# Patient Record
Sex: MALE | Race: BLACK OR AFRICAN AMERICAN | NOT HISPANIC OR LATINO | Employment: OTHER | URBAN - METROPOLITAN AREA
[De-identification: names, ages, dates, MRNs, and addresses within clinical notes are randomized per-mention and may not be internally consistent; named-entity substitution may affect disease eponyms.]

---

## 2017-01-05 ENCOUNTER — TELEPHONE (OUTPATIENT)
Dept: FAMILY MEDICINE | Facility: CLINIC | Age: 57
End: 2017-01-05

## 2017-01-05 NOTE — TELEPHONE ENCOUNTER
----- Message from Thalia Yu sent at 1/5/2017  1:11 PM CST -----  Contact: [phone]  Pt is requesting a order for blood work for a complete panel  but pt said he received a letter from the blood bank stating he tested positive for Hepatitis B and he wants to follow up on that pt is requesting  to get it done today of possible Please call pt at your earliest convenience.  Thanks !

## 2017-01-11 ENCOUNTER — DOCUMENTATION ONLY (OUTPATIENT)
Dept: BARIATRICS | Facility: CLINIC | Age: 57
End: 2017-01-11

## 2017-01-12 ENCOUNTER — TELEPHONE (OUTPATIENT)
Dept: FAMILY MEDICINE | Facility: CLINIC | Age: 57
End: 2017-01-12

## 2017-01-12 NOTE — TELEPHONE ENCOUNTER
----- Message from Юлия Noble sent at 1/12/2017  9:56 AM CST -----  Contact: self  Pt calling to schedule labs to check hep b and diabetes. Please call [phone].

## 2017-03-14 ENCOUNTER — PATIENT MESSAGE (OUTPATIENT)
Dept: FAMILY MEDICINE | Facility: CLINIC | Age: 57
End: 2017-03-14

## 2017-03-14 DIAGNOSIS — I10 ESSENTIAL HYPERTENSION: Primary | ICD-10-CM

## 2017-03-14 DIAGNOSIS — D50.9 IRON DEFICIENCY ANEMIA, UNSPECIFIED IRON DEFICIENCY ANEMIA TYPE: ICD-10-CM

## 2017-03-14 DIAGNOSIS — E55.9 VITAMIN D DEFICIENCY DISEASE: ICD-10-CM

## 2017-03-14 DIAGNOSIS — E11.9 CONTROLLED TYPE 2 DIABETES MELLITUS WITHOUT COMPLICATION, WITHOUT LONG-TERM CURRENT USE OF INSULIN: Chronic | ICD-10-CM

## 2017-03-16 NOTE — TELEPHONE ENCOUNTER
Dr. Hernandez would you put in orders for my diabetic / annual labs on  March 27, 2017  To be done at the [facility]. I have am 8:00 a.m. appointment with Dr. Ayaan Castellon, @ that location, therefore I  would  not have to come back later to have it done. Thank you for your consideration  Washington Manzo

## 2017-03-27 ENCOUNTER — LAB VISIT (OUTPATIENT)
Dept: LAB | Facility: HOSPITAL | Age: 57
End: 2017-03-27
Attending: INTERNAL MEDICINE
Payer: COMMERCIAL

## 2017-03-27 ENCOUNTER — OFFICE VISIT (OUTPATIENT)
Dept: UROLOGY | Facility: CLINIC | Age: 57
End: 2017-03-27
Payer: COMMERCIAL

## 2017-03-27 VITALS
BODY MASS INDEX: 45.1 KG/M2 | HEART RATE: 73 BPM | HEIGHT: 70 IN | SYSTOLIC BLOOD PRESSURE: 136 MMHG | DIASTOLIC BLOOD PRESSURE: 83 MMHG | WEIGHT: 315 LBS

## 2017-03-27 DIAGNOSIS — Z80.42 FAMILY HISTORY OF PROSTATE CANCER: Primary | ICD-10-CM

## 2017-03-27 DIAGNOSIS — E11.9 CONTROLLED TYPE 2 DIABETES MELLITUS WITHOUT COMPLICATION, WITHOUT LONG-TERM CURRENT USE OF INSULIN: Chronic | ICD-10-CM

## 2017-03-27 DIAGNOSIS — E55.9 VITAMIN D DEFICIENCY DISEASE: ICD-10-CM

## 2017-03-27 DIAGNOSIS — D50.9 IRON DEFICIENCY ANEMIA, UNSPECIFIED IRON DEFICIENCY ANEMIA TYPE: ICD-10-CM

## 2017-03-27 DIAGNOSIS — Z80.42 FAMILY HISTORY OF PROSTATE CANCER IN FATHER: ICD-10-CM

## 2017-03-27 DIAGNOSIS — N13.8 BPH WITH URINARY OBSTRUCTION: ICD-10-CM

## 2017-03-27 DIAGNOSIS — I10 ESSENTIAL HYPERTENSION: ICD-10-CM

## 2017-03-27 DIAGNOSIS — N40.1 BPH WITH URINARY OBSTRUCTION: ICD-10-CM

## 2017-03-27 LAB
25(OH)D3+25(OH)D2 SERPL-MCNC: 17 NG/ML
ALBUMIN SERPL BCP-MCNC: 3 G/DL
ALP SERPL-CCNC: 96 U/L
ALT SERPL W/O P-5'-P-CCNC: 23 U/L
ANION GAP SERPL CALC-SCNC: 6 MMOL/L
AST SERPL-CCNC: 18 U/L
BASOPHILS # BLD AUTO: 0.02 K/UL
BASOPHILS NFR BLD: 0.3 %
BILIRUB SERPL-MCNC: 0.6 MG/DL
BUN SERPL-MCNC: 17 MG/DL
CALCIUM SERPL-MCNC: 8.7 MG/DL
CHLORIDE SERPL-SCNC: 104 MMOL/L
CHOLEST/HDLC SERPL: 3.2 {RATIO}
CO2 SERPL-SCNC: 29 MMOL/L
COMPLEXED PSA SERPL-MCNC: 0.44 NG/ML
COMPLEXED PSA SERPL-MCNC: 0.44 NG/ML
CREAT SERPL-MCNC: 0.8 MG/DL
DIFFERENTIAL METHOD: ABNORMAL
EOSINOPHIL # BLD AUTO: 0.1 K/UL
EOSINOPHIL NFR BLD: 1.6 %
ERYTHROCYTE [DISTWIDTH] IN BLOOD BY AUTOMATED COUNT: 15.1 %
EST. GFR  (AFRICAN AMERICAN): >60 ML/MIN/1.73 M^2
EST. GFR  (NON AFRICAN AMERICAN): >60 ML/MIN/1.73 M^2
ESTIMATED AVG GLUCOSE: 126 MG/DL
FERRITIN SERPL-MCNC: 27 NG/ML
GLUCOSE SERPL-MCNC: 107 MG/DL
HBA1C MFR BLD HPLC: 6 %
HCT VFR BLD AUTO: 39.1 %
HDL/CHOLESTEROL RATIO: 31.3 %
HDLC SERPL-MCNC: 201 MG/DL
HDLC SERPL-MCNC: 63 MG/DL
HGB BLD-MCNC: 12.5 G/DL
IRON SERPL-MCNC: 123 UG/DL
LDLC SERPL CALC-MCNC: 114.4 MG/DL
LYMPHOCYTES # BLD AUTO: 3.1 K/UL
LYMPHOCYTES NFR BLD: 48.3 %
MCH RBC QN AUTO: 25.8 PG
MCHC RBC AUTO-ENTMCNC: 32 %
MCV RBC AUTO: 81 FL
MONOCYTES # BLD AUTO: 0.5 K/UL
MONOCYTES NFR BLD: 7.2 %
NEUTROPHILS # BLD AUTO: 2.7 K/UL
NEUTROPHILS NFR BLD: 42.6 %
NONHDLC SERPL-MCNC: 138 MG/DL
PLATELET # BLD AUTO: 308 K/UL
PMV BLD AUTO: 10 FL
POTASSIUM SERPL-SCNC: 4.1 MMOL/L
PROT SERPL-MCNC: 6.8 G/DL
RBC # BLD AUTO: 4.85 M/UL
SATURATED IRON: 31 %
SODIUM SERPL-SCNC: 139 MMOL/L
TOTAL IRON BINDING CAPACITY: 401 UG/DL
TRANSFERRIN SERPL-MCNC: 271 MG/DL
TRIGL SERPL-MCNC: 118 MG/DL
TSH SERPL DL<=0.005 MIU/L-ACNC: 1.23 UIU/ML
WBC # BLD AUTO: 6.38 K/UL

## 2017-03-27 PROCEDURE — 99999 PR PBB SHADOW E&M-EST. PATIENT-LVL III: CPT | Mod: PBBFAC,,, | Performed by: UROLOGY

## 2017-03-27 PROCEDURE — 1160F RVW MEDS BY RX/DR IN RCRD: CPT | Mod: S$GLB,,, | Performed by: UROLOGY

## 2017-03-27 PROCEDURE — 84466 ASSAY OF TRANSFERRIN: CPT

## 2017-03-27 PROCEDURE — 84443 ASSAY THYROID STIM HORMONE: CPT

## 2017-03-27 PROCEDURE — 84153 ASSAY OF PSA TOTAL: CPT

## 2017-03-27 PROCEDURE — 36415 COLL VENOUS BLD VENIPUNCTURE: CPT

## 2017-03-27 PROCEDURE — 83540 ASSAY OF IRON: CPT

## 2017-03-27 PROCEDURE — 3079F DIAST BP 80-89 MM HG: CPT | Mod: S$GLB,,, | Performed by: UROLOGY

## 2017-03-27 PROCEDURE — 82306 VITAMIN D 25 HYDROXY: CPT

## 2017-03-27 PROCEDURE — 80053 COMPREHEN METABOLIC PANEL: CPT

## 2017-03-27 PROCEDURE — 3075F SYST BP GE 130 - 139MM HG: CPT | Mod: S$GLB,,, | Performed by: UROLOGY

## 2017-03-27 PROCEDURE — 84153 ASSAY OF PSA TOTAL: CPT | Mod: 91

## 2017-03-27 PROCEDURE — 83036 HEMOGLOBIN GLYCOSYLATED A1C: CPT

## 2017-03-27 PROCEDURE — 82728 ASSAY OF FERRITIN: CPT

## 2017-03-27 PROCEDURE — 85025 COMPLETE CBC W/AUTO DIFF WBC: CPT

## 2017-03-27 PROCEDURE — 80061 LIPID PANEL: CPT

## 2017-03-27 PROCEDURE — 99213 OFFICE O/P EST LOW 20 MIN: CPT | Mod: S$GLB,,, | Performed by: UROLOGY

## 2017-03-27 NOTE — MR AVS SNAPSHOT
[facility] - Urology 4th Floor  [address]  [facility] [address]  Phone: [phone]                  Washington Manzo   3/27/2017 8:15 AM   Office Visit    Description:  Male : 1960   Provider:  Ayaan Reyes Jr., MD   Department:  [facility] - Urology 4th Floor           Reason for Visit     Benign Prostatic Hypertrophy           Diagnoses this Visit        Comments    Family history of prostate cancer    -  Primary            To Do List           Goals (5 Years of Data)     None      [facility]     [facility] Nurse Care Line -  Assistance  Registered nurses in the [facility] provide clinical advisement, health education, appointment booking, and other advisory services.  Call for this free service at [phone].             Medications           Message regarding Medications     Verify the changes and/or additions to your medication regime listed below are the same as discussed with your clinician today.  If any of these changes or additions are incorrect, please notify your healthcare provider.             Verify that the below list of medications is an accurate representation of the medications you are currently taking.  If none reported, the list may be blank. If incorrect, please contact your healthcare provider. Carry this list with you in case of emergency.           Current Medications     b complex vitamins tablet Take 1 tablet by mouth once daily.    BREEZE 2 TEST STRIPS Strp     CALCIUM CITRATE/VITAMIN D3 (CALCIUM CITRATE + D ORAL) Take 1,500 mg by mouth.    cyanocobalamin (VITAMIN B-12) 1000 MCG tablet Take 1,000 mcg by mouth once a week.     LANCETS & BLOOD GLUCOSE STRIPS MISC 2 (two) times daily.     polyethylene glycol (GLYCOLAX) 17 gram/dose powder TAKE ONE CAPFUL mixed IN WATER BY MOUTH DAILY    tadalafil (CIALIS) 20 MG Tab Take 1 tablet (20 mg total) by mouth as directed. 1 tab as needed every 36 hours    valsartan-hydrochlorothiazide  "(DIOVAN-HCT) 160-12.5 mg per tablet TAKE ONE TABLET BY MOUTH EVERY DAY    vitamin D 1000 units Tab Take by mouth once daily.    hydrocodone-acetaminophen 7.5-325mg (NORCO) 7.5-325 mg per tablet Take 1 tablet by mouth every 6 (six) hours as needed for Pain.    multivitamin with minerals tablet Take 1 tablet by mouth once daily.           Clinical Reference Information           Your Vitals Were     BP Pulse Height Weight BMI    136/83 73 5' 10" (1.778 m) 159.7 kg (352 lb 1.2 oz) 50.52 kg/m2      Blood Pressure          Most Recent Value    BP  136/83      Allergies as of 3/27/2017     Mobic [Meloxicam]    Morphine      Immunizations Administered on Date of Encounter - 3/27/2017     None      Orders Placed During Today's Visit     Future Labs/Procedures Expected by Expires    Prostate Specific Antigen, Diagnostic  9/23/2017 3/27/2018      Language Assistance Services     ATTENTION: Language assistance services are available, free of charge. Please call [phone].      ATENCIÓN: Si habla dillon, tiene a armando disposición servicios gratuitos de asistencia lingüística. Llame al [phone].     LISS Ý: N?u b?n nói Ti?ng Vi?t, có các d?ch v? h? tr? ngôn ng? mi?n phí dành cho b?n. G?i s? [phone].         [facility]rology 4th Floor complies with applicable Federal civil rights laws and does not discriminate on the basis of race, color, national origin, age, disability, or sex.        "

## 2017-04-13 DIAGNOSIS — Z11.59 NEED FOR HEPATITIS C SCREENING TEST: ICD-10-CM

## 2017-05-03 DIAGNOSIS — K59.00 CONSTIPATION, UNSPECIFIED CONSTIPATION TYPE: ICD-10-CM

## 2017-05-04 RX ORDER — POLYETHYLENE GLYCOL 3350 17 G/17G
POWDER, FOR SOLUTION ORAL
Qty: 510 G | Refills: 11 | Status: SHIPPED | OUTPATIENT
Start: 2017-05-04

## 2017-06-19 ENCOUNTER — INITIAL CONSULT (OUTPATIENT)
Dept: BARIATRICS | Facility: CLINIC | Age: 57
End: 2017-06-19
Payer: COMMERCIAL

## 2017-06-19 VITALS
HEART RATE: 76 BPM | BODY MASS INDEX: 45.1 KG/M2 | HEIGHT: 70 IN | SYSTOLIC BLOOD PRESSURE: 128 MMHG | DIASTOLIC BLOOD PRESSURE: 76 MMHG | WEIGHT: 315 LBS

## 2017-06-19 DIAGNOSIS — Z98.84 HISTORY OF BARIATRIC SURGERY: ICD-10-CM

## 2017-06-19 DIAGNOSIS — Z98.84 HISTORY OF GASTRIC BYPASS: ICD-10-CM

## 2017-06-19 DIAGNOSIS — G89.29 CHRONIC PAIN OF BOTH KNEES: ICD-10-CM

## 2017-06-19 DIAGNOSIS — E11.9 CONTROLLED TYPE 2 DIABETES MELLITUS WITHOUT COMPLICATION, WITHOUT LONG-TERM CURRENT USE OF INSULIN: Chronic | ICD-10-CM

## 2017-06-19 DIAGNOSIS — M25.561 CHRONIC PAIN OF BOTH KNEES: ICD-10-CM

## 2017-06-19 DIAGNOSIS — M25.562 CHRONIC PAIN OF BOTH KNEES: ICD-10-CM

## 2017-06-19 DIAGNOSIS — I10 ESSENTIAL HYPERTENSION: ICD-10-CM

## 2017-06-19 DIAGNOSIS — E66.01 MORBID OBESITY WITH BMI OF 50.0-59.9, ADULT: Primary | ICD-10-CM

## 2017-06-19 PROCEDURE — 3044F HG A1C LEVEL LT 7.0%: CPT | Mod: S$GLB,,, | Performed by: INTERNAL MEDICINE

## 2017-06-19 PROCEDURE — 99215 OFFICE O/P EST HI 40 MIN: CPT | Mod: S$GLB,,, | Performed by: INTERNAL MEDICINE

## 2017-06-19 PROCEDURE — 99999 PR PBB SHADOW E&M-EST. PATIENT-LVL III: CPT | Mod: PBBFAC,,, | Performed by: INTERNAL MEDICINE

## 2017-06-19 PROCEDURE — 4010F ACE/ARB THERAPY RXD/TAKEN: CPT | Mod: S$GLB,,, | Performed by: INTERNAL MEDICINE

## 2017-06-19 NOTE — PATIENT INSTRUCTIONS
Start Trulicity once a week.     Www.[employer] for coupon    1500 calories per day. Food logs.     Meal Ideas   *Recipes and products available at [email or web address] [email or web address]      Breakfast: (15-20g protein)    - Egg white omelet: 2 egg whites or ½ cup Egg Beaters. (Optional proteins: cheese, shrimp, black beans, chicken, sliced turkey) (Optional veggies: tomatoes, salsa, spinach, mushrooms, onions, green peppers, or small slice avocado)     - Egg and sausage: 1 egg or ¼ cup Egg Beaters (any variety), with 1 brandyn or 2 links of Turkey sausage or Veggie breakfast sausage (ParkerVision or Poikos)    - Crust-less breakfast quiche: To make a glass pie dish, mix 4oz part skim Ricotta, 1 cup skim milk, and 2 eggs as your base. Add protein: shredded cheese, sliced lean ham or turkey, turkey yin/sausage. Add veggies: tomato, onion, green onion, mushroom, green pepper, spinach, etc.    - Yogurt parfait: Mix 1 - 6oz container Dannon Light N Fit vanilla yogurt, with ¼ cup Kashi Go Lean cereal    - Cottage cheese and fruit: ½ cup part-skim cottage cheese or ricotta cheese topped with fresh fruit or sugar free preserves     - Aye Rose's Vanilla Egg custard* (add 2 Tbsp instant coffee granules to make Cappuccino Custard*)    - Hi-Protein café latte (skim milk, decaf coffee, 1 scoop protein powder). Optional to add Sugar free syrup or extract flavoring.    Lunch: (20-30g protein)    - ½ cup Black bean soup (Homemade or Progresso), with ¼ cup shredded low-fat cheese. Top with chopped tomato or fresh salsa.     - Lean deli turkey breast and low-fat sliced cheese, mustard or light gonzales to moisten, rolled up together, or wrapped in a Juan F lettuce leaf    - Chicken salad made from dinner leftovers, moisten with low-fat salad dressing or light gonzales. Also try leftover salmon, shrimp, tuna or boiled eggs. Serve ½ cup over dark green salad    - Fat-free canned refried beans, topped with ¼ cup shredded low-fat cheese. Top with  chopped tomato or fresh salsa.     - Greek salad: Top mixed greens with 1-2oz grilled chicken, tomatoes, red onions, 2-3 kalamata olives, and sprinkle lightly with feta cheese. Spritz with Balsamic vinegar to taste.     - Crust-less lunch quiche: To make a glass pie dish, mix 4oz part skim Ricotta, 1 cup skim milk, and 2 eggs as your base. Add protein: shredded cheese, sliced lean ham or turkey, shrimp, chicken. Add veggies: tomato, onion, green onion, mushroom, green pepper, spinach, artichoke, broccoli, etc.    - Pizza bake: tomato sauce, low-fat shredded mozzarella and turkey pepperoni or Burundian yin. Add any veggies.    - Cucumber crab bites: Spread ¼ cup crab dip (lump crabmeat + light cream cheese and green onions) over sliced cucumber.     - Chicken with light spinach and artichoke dip*: Puree in : 6oz cooked and drained spinach, 2 cloves garlic, 1 can cannelloni beans, ½ cup chopped green onions, 1 can drained artichoke hearts (not marinated in oil), lemon juice and basil. Mix in 2oz chopped up chicken.    Supper: (20-30g protein)    - Serve grilled fish over dark green salad tossed with low-fat dressing, served with grilled asparagus carias     - Rotisserie chicken salad: served with sliced strawberries, walnuts, fat-free feta cheese crumbles and 1 tbsp Rogerss Own Light Raspberry [place] Vinaigrette    - Shrimp cocktail: Dip cold boiled shrimp in homemade low-sugar cocktail sauce (1/2 cup Adryan One Carb ketchup, 2 tbsp horseradish, 1/4 tsp hot sauce, 1 tsp Worcestershire sauce, 1 tbsp freshly-squeezed lemon juice). Serve with dark green salad, walnuts, and crumbled blue cheese drizzled with olive oil and Balsamic vinegar    - Tuna Melt: Spread tuna salad onto 2 thick slices of tomato. Top with low-fat cheese and broil until cheese is melted. May also be made with chicken salad of shrimp salad. [place] with different types of cheeses.    - Homemade low-fat Chili using extra lean ground  beef or ground turkey. Top with shredded cheese and salsa as desired. May add dollop fat-free sour cream if desired    - Dinner Omelet with shrimp or chicken and onion, green peppers and chives.    - No noodle lasagna: Use sliced zucchini or eggplant in place of noodles.  Layer with part skim ricotta cheese and low sugar meat sauce (use very lean ground beef or ground turkey).    - Mexican chicken bake: Bake chunks of chicken breast or thigh with taco seasoning, Pace brand enchilada sauce, green onions and low-fat cheese. Serve with ¼ cup black beans or fat free refried beans topped with chopped tomatoes or salsa.    - Tony frozen meatballs, simmered in Classico Marinara sauce. Different flavors of salsa or spaghetti sauce create different dishes! Sprinkle with parmesan cheese. Serve with grilled or steamed veggies, or a dark green salad.    - Simmer boneless skinless chicken thigh chunks in Classico Marinara sauce or roasted salsa until tender with chopped onion, bell pepper, garlic, mushrooms, spinach, etc.     - Hamburger, without the bun, dressed the way you like. Served with grilled or steamed veggies.    - Eggplant parmesan: Bake slices of eggplant at 350 degrees for 15 minutes. Layer tomato sauce, sliced eggplant and low-fat mozzarella cheese in a baking dish and cover with foil. Bake 30-40 more minutes or until bubbly. Uncover and bake at 400 degrees for about 15 more minutes, or until top is slightly crisp.    - Fish tacos: grilled/baked white fish, wrapped in Juan F lettuce leaf, topped with salsa, shredded low-fat cheese, and light coleslaw.    Snacks: (100-200 calories; >5g protein)    - 1 low-fat cheese stick with 8 cherry tomatoes or 1 serving fresh fruit  - 4 thin slices fat-free turkey breast and 1 slice low-fat cheese  - 4 thin slices fat-free honey ham with wedge of melon  - 1/4 cup unsalted nuts with ½ cup fruit  - 6-oz container Dannon Light n Fit vanilla yogurt, topped with 1oz unsalted  nuts         - apple, celery or baby carrots spread with 2 Tbsp natural peanut butter or almond butter   - apple slices with 1 oz slice low-fat cheese  - celery, cucumber, bell pepper or baby carrots dipped in ¼ cup hummus bean spread or light spinach and artichoke dip (*recipe in lunch section)  - 100 calorie bag microwave light popcorn with 3 tbsp grated parmesan cheese  - Candido Links Beef Steak - 14g protein! (similar to beef jerky)  - 2 wedges Laughing Cow - Light Herb & Garlic Cheese with sliced cucumber or green bell pepper  - 1/2 cup low-fat cottage cheese with ¼ cup fruit or ¼ cup salsa  - RTD Protein drinks: Atkins, Low Carb Slim Fast, EAS light, Muscle Milk Light, etc.  - Homemade Protein drinks: GNC Soy95, Isopure, Nectar, UNJURY, Whey Gourmet, etc. Mix 1 scoop powder with 8oz skim/1% milk or light soymilk.  - Protein bars: Atkins, EAS, Pure Protein, Think Thin, Detour, etc. Must have 0-4 grams sugar - Read the label.    Takeout Options: No more than twice/week  Deli - Salads (no pasta or rice), meats, cheeses. Roasted chicken. Lox (salmon)    Mexican - Platters which don't include tortillas, chips, or rice. Go easy on the beans. Example: Fajitas without the tortillas. Ask the  not to bring chips to the table if they are too tempting.    Greek - Meat or fish and vegetable, but no bread or rice. Including hummus, baba ganoush, etc, is OK. Most sit-down Greek restaurants can provide you with cucumber slices for dipping instead of miya bread.    Fast Food (Avoid as much as possible) - Salads (no croutons and limit salad dressing to 2 tbsp), grilled chicken sandwich without the bun and ask for no gonzales. Prettys low fat chili or Taco Bell pintos and cheese.    BBQ - The meats are fine if you ask for sauces on the side, but most of the traditional side dishes are loaded with carbs. Albaro slaw, baked beans and BBQ sauce are typically made with sugar.    Chinese - Nothing deep-fried, no rice or noodles. Many  Chinese sauces have starch and sugar in them, so you'll have to use your judgement. If you find that these sauces trigger cravings, or cause Dumping, you can ask for the sauce to be made without sugar or just use soy sauce.

## 2017-06-19 NOTE — PROGRESS NOTES
Subjective:       Patient ID: Washington Manzo is a 56 y.o. male.    Chief Complaint: Consult    Current attempts at weight loss: new pt to me, with Patient Active Problem List:     DM (diabetes mellitus)     History of colonic polyps     Iron deficiency anemia     Vitamin D deficiency disease     Sleep apnea     Morbid obesity     Controlled diabetes mellitus type II without complication- A1c is increasing.      Hyperlipidemia     Lymphedema     Knee pain, bilateral     Essential hypertension     History of bariatric surgery     ED (erectile dysfunction)     Acute left lumbar radiculopathy     Muscle spasm of back     Muscle spasm of left lower extremity      Lab Results       Component                Value               Date                       HGBA1C                   6.0                 03/27/2017                 HGBA1C                   5.7                 09/27/2016                 HGBA1C                   5.4                 11/19/2015            Lab Results       Component                Value               Date                       LDLCALC                  114.4               03/27/2017                 CREATININE               0.8                 03/27/2017              Has been gaining weight back gradually in the past year. He does feel he is less active in the past year. Knees have been hurting more with the weight gain. He does feel he is able to eat more and is hungry more. He is eating throughout the day. Has noted increase in hypoglycemia recently. Usually if not eating 4-6 hours.         Heaviest weight: Around 500#    Lightest weight: 285# at about 1.5 years.     Goal weight:  200lbs. Under 300 to start with     History of medication for loss: Adipex. Phen-fen.     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 Share in cooking. Wife just had gastric bypass.   Breakfast: Eggs (2) and  "sausage (4-6 oz)    Lunch: Spinach or spinach salad and meat (10-12 oz)     Dinner: Similar to lunch.     Snacks: peanuts, cashews, chips, cracklin,     Beverages:  Crystal light, coffee with splenda and SF creamer and water. Does drink while eating.     Willingness to change: 10/10    EKG:Sinus rhythm with 1st degree A-V block  Otherwise normal ECG  When compared with ECG of 11-JUL-2012 13:17,  Previous tracing not available for comparison    [id number]        Review of Systems   Constitutional: Negative for chills and fever.   Respiratory: Negative for apnea and shortness of breath.    Cardiovascular: Positive for leg swelling. Negative for chest pain.   Gastrointestinal: Positive for constipation. Negative for diarrhea.        Denies HB   Endocrine:        Occasional hypoglycemia   Genitourinary: Negative for difficulty urinating and dysuria.   Musculoskeletal: Positive for arthralgias. Negative for back pain.        Knees and ankles   Neurological: Negative for dizziness and light-headedness.   Psychiatric/Behavioral: Negative for dysphoric mood. The patient is not nervous/anxious.        Objective:     /76   Pulse 76   Ht 5' 10" (1.778 m)   Wt (!) 159.4 kg (351 lb 6.6 oz)   BMI 50.42 kg/m²     Physical Exam   Constitutional: He is oriented to person, place, and time. He appears well-developed. No distress.   Morbidly obese     HENT:   Head: Normocephalic and atraumatic.   Eyes: Pupils are equal, round, and reactive to light. No scleral icterus.   Neck: Normal range of motion. Neck supple. No thyromegaly present.   Cardiovascular: Normal rate, regular rhythm and normal heart sounds.  Exam reveals no gallop and no friction rub.    No murmur heard.  Pulmonary/Chest: Effort normal and breath sounds normal. No respiratory distress. He has no wheezes.   Abdominal: Soft. Bowel sounds are normal. He exhibits no distension. There is no tenderness.   Musculoskeletal: Normal range of motion. He exhibits edema. "   Lymphedema LLE.    Neurological: He is alert and oriented to person, place, and time.   Skin: Skin is warm and dry.   Psychiatric: He has a normal mood and affect. His behavior is normal. Judgment normal.   Vitals reviewed.      Assessment:       1. Morbid obesity with BMI of 50.0-59.9, adult    2. Controlled type 2 diabetes mellitus without complication, without long-term current use of insulin    3. Chronic pain of both knees    4. History of bariatric surgery    5. Essential hypertension    6. History of gastric bypass        Plan:         1. Morbid obesity with BMI of 50.0-59.9, adult  1500 calories per day. Food logs.     Meal ideas given.     Pool exercise suggested.     2. Controlled type 2 diabetes mellitus without complication, without long-term current use of insulin    Start Trulicity once a week.     Www.trulicity.com for coupon  3. Chronic pain of both knees  Increase low impact activity as tolerated.  Avoid high impact activity, very heavy lifting or other exercise regimens that may cause discomfort.       4. History of bariatric surgery  No drinking while eating.     5. Essential hypertension  The current medical regimen is effective;  continue present plan and medications.     6. History of gastric bypass

## 2017-08-07 RX ORDER — VALSARTAN AND HYDROCHLOROTHIAZIDE 160; 12.5 MG/1; MG/1
TABLET, FILM COATED ORAL
Qty: 30 TABLET | Refills: 3 | Status: SHIPPED | OUTPATIENT
Start: 2017-08-07 | End: 2017-09-22 | Stop reason: ALTCHOICE

## 2017-08-07 RX ORDER — VALSARTAN AND HYDROCHLOROTHIAZIDE 160; 12.5 MG/1; MG/1
TABLET, FILM COATED ORAL
Qty: 30 TABLET | Refills: 3 | Status: SHIPPED | OUTPATIENT
Start: 2017-08-07 | End: 2017-08-07 | Stop reason: SDUPTHER

## 2017-08-09 ENCOUNTER — OFFICE VISIT (OUTPATIENT)
Dept: BARIATRICS | Facility: CLINIC | Age: 57
End: 2017-08-09
Payer: COMMERCIAL

## 2017-08-09 VITALS
BODY MASS INDEX: 45.1 KG/M2 | WEIGHT: 315 LBS | DIASTOLIC BLOOD PRESSURE: 78 MMHG | SYSTOLIC BLOOD PRESSURE: 128 MMHG | HEIGHT: 70 IN | HEART RATE: 76 BPM

## 2017-08-09 DIAGNOSIS — E11.9 CONTROLLED TYPE 2 DIABETES MELLITUS WITHOUT COMPLICATION, WITHOUT LONG-TERM CURRENT USE OF INSULIN: Chronic | ICD-10-CM

## 2017-08-09 DIAGNOSIS — E11.9 TYPE 2 DIABETES MELLITUS WITHOUT COMPLICATION, WITHOUT LONG-TERM CURRENT USE OF INSULIN: Primary | Chronic | ICD-10-CM

## 2017-08-09 DIAGNOSIS — Z98.84 HISTORY OF GASTRIC BYPASS: ICD-10-CM

## 2017-08-09 DIAGNOSIS — E66.01 MORBID OBESITY WITH BMI OF 45.0-49.9, ADULT: ICD-10-CM

## 2017-08-09 PROCEDURE — 3078F DIAST BP <80 MM HG: CPT | Mod: S$GLB,,, | Performed by: INTERNAL MEDICINE

## 2017-08-09 PROCEDURE — 3074F SYST BP LT 130 MM HG: CPT | Mod: S$GLB,,, | Performed by: INTERNAL MEDICINE

## 2017-08-09 PROCEDURE — 99999 PR PBB SHADOW E&M-EST. PATIENT-LVL III: CPT | Mod: PBBFAC,,, | Performed by: INTERNAL MEDICINE

## 2017-08-09 PROCEDURE — 99213 OFFICE O/P EST LOW 20 MIN: CPT | Mod: S$GLB,,, | Performed by: INTERNAL MEDICINE

## 2017-08-09 PROCEDURE — 4010F ACE/ARB THERAPY RXD/TAKEN: CPT | Mod: S$GLB,,, | Performed by: INTERNAL MEDICINE

## 2017-08-09 PROCEDURE — 3044F HG A1C LEVEL LT 7.0%: CPT | Mod: S$GLB,,, | Performed by: INTERNAL MEDICINE

## 2017-08-09 PROCEDURE — 3008F BODY MASS INDEX DOCD: CPT | Mod: S$GLB,,, | Performed by: INTERNAL MEDICINE

## 2017-08-09 RX ORDER — PHENTERMINE HYDROCHLORIDE 37.5 MG/1
TABLET ORAL
Qty: 30 TABLET | Refills: 1 | Status: SHIPPED | OUTPATIENT
Start: 2017-08-09 | End: 2017-11-09

## 2017-08-09 NOTE — PATIENT INSTRUCTIONS
Patient warned of common side effects of phentermine including anxiety, insomnia, palpitations and increased blood pressure. It was also explained that it is for short-term usage along with diet and exercise, and that stopping the medication without making lifestyle changes will result in regain of weight. Patient states understanding.     Weight loss medications are controlled substances.  They require routine follow up. Prescription or pills that are lost or destroyed will not be replaced.     Start phentermine with 1/2 pill a day to see if that will control your appetite.  Go up to a full pill when needed.     Reduce calories to 1200.     Log your calories.     - To lose weight you want to cut 100% starchy carbohydrates out of your diet (bread, rice, pasta, potatoes, granola, flour, corn, peas, oatmeal, grits, tortillas, crackers, chips) and get  grams of protein.  Aim for 100 grams of protein daily.    - Premier Protein (Chocolate, Bananas & Cream, Strawberries & Cream, Vanilla) Jo or [employer]co    - Syntrax Valders from [employer], www.bariatricadvantage.com, www.bariatricchoice.com. (LACTOSE FREE)    - Atkins Lift - [employer] & Jo (LACTOSE FREE)    - Veggetti Pro from [employer], Wonderloop, [employer] Beyond    - www.pinterest.com (cauliflower, cloud bread, quest bar cookies, eggplant, zucchini, zucchini noodles, crustless quiche, no carb meals, taco lettuce boats)    - http://radha.Sand Technology.MakerBot/        Sample meal plan  80-120g protein; 5621-7312 calories  Protein drinks and bars: 0-4 grams sugar  Drink lots of water throughout the day and exercise!  MENU # 1  Breakfast: 2 eggs, 1 turkey sausage brandyn  Lunch: 2-3 roll-ups (sliced turkey, low-fat slice cheese), baby carrots dipped in 1 Tbsp natural peanut butter  Mid-Day Snack: ¼ cup unsalted almonds, ½ cup fruit  Supper: 1 chicken thigh simmered in tomato sauce + 2 Tbsp mozzarella cheese, ½ cup black beans, 1/2 cup steamed  veggies  Evening Snack: 1/2 cup grapes with 4 cubes low-fat cheddar cheese   ___________________________________________________  MENU # 2  Breakfast: protein drink  Mid-morning snack : ¼ cup unsalted nuts  Lunch: 1 cup tuna or chicken salad made with light gonzales, over salad.   Supper: hamburger brandyn, slice of cheese, 1 cup steamed veggies.   Snack: light yogurt      Menu #3  Breakfast: 6oz plain Greek yogurt + fruit (½ banana, ½ cup fruit - pineapple, blueberries, strawberries, peach), may add Splenda to seferino.  Lunch: ½  chicken breast w/ slice pepper pura cheese, 1/2 cup steamed veggies and small salad with Salad Spritzer.    Mid-Day snack: protein drink   Supper: 4oz Grilled fish, grilled veggie kabob ( mushrooms, onion, bell pepper, yellow squash, zucchini, cherry tomatoes)  Evening Snack: fudgsicle no-sugar-added    Menu # 4  Breakfast: vanilla iced coffee: 1 scoop vanilla protein powder + 4oz skim milk + 4oz coffee   Snack: protein bar  Lunch: 2 Lettuce tacos: ¼ cup seasoned ground turkey wrapped in a Juan F lettuce leaf with 1 Tbsp shredded cheese and dollop low-fat sour cream  Dinner: Shrimp omelet: 2 eggs, ½ cup shrimp, green onions, and shredded cheese        Menu #5  Breakfast: 1 cup low-fat cottage cheese, ½ cup peaches (no sugar added)  Lunch: 2 oz baked pork chop, 1 cup okra and tomato stew  Snack: 1 cup black beans + salsa + dollop sour cream  Dinner: Caprese chicken salad: 2 oz chicken, 1oz fresh mozzarella, sliced tomato, 1 Tbsp olive oil, basil  Snack: sugar-free pudding cup      Menu #6  Breakfast: ½ cup part-skim ricotta cheese, 2 Tbsp sugar-free strawberry preserves, ¼ cup slivered almonds  Lunch: 2 oz canned chicken, 1oz shredded cheddar cheese, ½ cup black beans, 2 Tbsp salsa  Snack: Protein drink  Dinner: 4 oz grilled salmon steak, over mixed green salad with light dressing

## 2017-08-09 NOTE — PROGRESS NOTES
"Subjective:       Patient ID: Washington Manzo is a 57 y.o. male.    Chief Complaint: Follow-up    Pt here today for follow-up. Has lost 7 lbs. Started trulicity with 1500 aurelia diet. BS has been around 95- 100. Some episodes of hypoglycemia.   He feels appetite was down initially with the trulicity. Had some Nausea initially, but went away. Feels full. Thinks he is going over 1500 calories. Has cut back on starches.               Review of Systems   Constitutional: Negative for chills and fever.   Respiratory: Negative for apnea and shortness of breath.    Cardiovascular: Positive for leg swelling. Negative for chest pain.   Gastrointestinal: Positive for constipation. Negative for diarrhea.        Denies HB   Endocrine:        Occasional hypoglycemia   Genitourinary: Negative for difficulty urinating and dysuria.   Musculoskeletal: Positive for arthralgias. Negative for back pain.        Knees and ankles   Neurological: Negative for dizziness and light-headedness.   Psychiatric/Behavioral: Negative for dysphoric mood. The patient is not nervous/anxious.        Objective:     Ht 5' 10" (1.778 m)   Wt (!) 156.2 kg (344 lb 5.7 oz)   BMI 49.41 kg/m²     Physical Exam   Constitutional: He is oriented to person, place, and time. He appears well-developed. No distress.   Morbidly obese     HENT:   Head: Normocephalic and atraumatic.   Eyes: Pupils are equal, round, and reactive to light. No scleral icterus.   Neck: Normal range of motion. Neck supple.   Cardiovascular: Normal rate, regular rhythm and normal heart sounds.  Exam reveals no gallop and no friction rub.    No murmur heard.  Pulmonary/Chest: Effort normal and breath sounds normal. No respiratory distress. He has no wheezes.   Musculoskeletal: Normal range of motion.   Lymphedema LLE.    Neurological: He is alert and oriented to person, place, and time.   Skin: Skin is warm and dry.   Psychiatric: He has a normal mood and affect. His behavior is normal. Judgment " normal.   Vitals reviewed.      Assessment:       No diagnosis found.    Plan:

## 2017-08-21 RX ORDER — DULAGLUTIDE 1.5 MG/.5ML
INJECTION, SOLUTION SUBCUTANEOUS
Qty: 2 ML | Refills: 2 | Status: SHIPPED | OUTPATIENT
Start: 2017-08-21 | End: 2017-11-09 | Stop reason: SDUPTHER

## 2017-08-23 ENCOUNTER — OFFICE VISIT (OUTPATIENT)
Dept: FAMILY MEDICINE | Facility: CLINIC | Age: 57
End: 2017-08-23
Payer: COMMERCIAL

## 2017-08-23 VITALS
SYSTOLIC BLOOD PRESSURE: 124 MMHG | HEIGHT: 70 IN | WEIGHT: 315 LBS | DIASTOLIC BLOOD PRESSURE: 68 MMHG | BODY MASS INDEX: 45.1 KG/M2 | OXYGEN SATURATION: 97 % | RESPIRATION RATE: 18 BRPM | TEMPERATURE: 99 F | HEART RATE: 85 BPM

## 2017-08-23 DIAGNOSIS — H10.33 ACUTE BACTERIAL CONJUNCTIVITIS OF BOTH EYES: ICD-10-CM

## 2017-08-23 DIAGNOSIS — H66.90 ACUTE OTITIS MEDIA, UNSPECIFIED LATERALITY, UNSPECIFIED OTITIS MEDIA TYPE: Primary | ICD-10-CM

## 2017-08-23 PROCEDURE — 99999 PR PBB SHADOW E&M-EST. PATIENT-LVL III: CPT | Mod: PBBFAC,,, | Performed by: FAMILY MEDICINE

## 2017-08-23 PROCEDURE — 3078F DIAST BP <80 MM HG: CPT | Mod: S$GLB,,, | Performed by: FAMILY MEDICINE

## 2017-08-23 PROCEDURE — 99214 OFFICE O/P EST MOD 30 MIN: CPT | Mod: S$GLB,,, | Performed by: FAMILY MEDICINE

## 2017-08-23 PROCEDURE — 3008F BODY MASS INDEX DOCD: CPT | Mod: S$GLB,,, | Performed by: FAMILY MEDICINE

## 2017-08-23 PROCEDURE — 3074F SYST BP LT 130 MM HG: CPT | Mod: S$GLB,,, | Performed by: FAMILY MEDICINE

## 2017-08-23 RX ORDER — ERYTHROMYCIN 5 MG/G
OINTMENT OPHTHALMIC 3 TIMES DAILY
Qty: 1 TUBE | Refills: 0 | Status: SHIPPED | OUTPATIENT
Start: 2017-08-23

## 2017-08-23 RX ORDER — AZITHROMYCIN 250 MG/1
TABLET, FILM COATED ORAL
Qty: 6 TABLET | Refills: 0 | Status: SHIPPED | OUTPATIENT
Start: 2017-08-23 | End: 2017-08-28

## 2017-08-23 NOTE — PROGRESS NOTES
HPI:  Washington Manzo is a 57 y.o. year old male that  presents with complaint of eye irritation that is worse this morning. He awoke with his eyes glued shut. He also has a headache and coughing . Hehhas been taking OTC benadryl. He is also having left ear pain.  Chief Complaint   Patient presents with    Nasal Congestion    Ear Fullness    Conjunctivitis     bilateral   .     HPI      Past Medical History:   Diagnosis Date    Arthritis     Colon polyp     Diabetes mellitus     Diabetes mellitus type II, uncontrolled 2/19/2013    ED (erectile dysfunction) 1/29/2014    History of bariatric surgery 1/29/2014    History of colonic polyps 10/17/2012    HTN (hypertension) 1/29/2014    Hyperlipidemia 5/16/2013    Hypertension     Iron deficiency anemia 2/19/2013    Knee pain, bilateral 7/12/2013    Lymphedema 7/12/2013    Morbid obesity 2/19/2013    Sleep apnea 2/19/2013    Vitamin D deficiency disease 2/19/2013     Social History     Social History    Marital status:      Spouse name: N/A    Number of children: N/A    Years of education: N/A     Occupational History    Not on file.     Social History Main Topics    Smoking status: Never Smoker    Smokeless tobacco: Never Used    Alcohol use No    Drug use: No    Sexual activity: Not on file     Other Topics Concern    Not on file     Social History Narrative    No narrative on file     Past Surgical History:   Procedure Laterality Date    COLONOSCOPY      GASTRIC BYPASS  5/24/2013    [facility]     Family History   Problem Relation Age of Onset    Cataracts Father     Prostate cancer Father     Colon polyps Mother     Cataracts Mother     Cancer Maternal Aunt      colon    No Known Problems Sister     Diabetes Brother     Diabetes Brother     Amblyopia Neg Hx     Blindness Neg Hx     Glaucoma Neg Hx     Hypertension Neg Hx     Macular degeneration Neg Hx     Retinal detachment Neg Hx     Strabismus Neg Hx     Stroke Neg  "Hx     Thyroid disease Neg Hx            Review of Systems  General ROS: negative for chills, fever or weight loss  ENT ROS: negative for epistaxis, sore throat or rhinorrhea,eye discharge and pain , left ear pain  Respiratory ROS: no cough, shortness of breath, or wheezing  Cardiovascular ROS: no chest pain or dyspnea on exertion  Gastrointestinal ROS: no abdominal pain, change in bowel habits, or black/ bloody stools    Physical Exam:  /68 (BP Location: Left arm, Patient Position: Sitting, BP Method: Medium (Manual))   Pulse 85   Temp 99 °F (37.2 °C) (Oral)   Resp 18   Ht 5' 10" (1.778 m)   Wt (!) 152.6 kg (336 lb 5 oz)   SpO2 97%   BMI 48.26 kg/m²   General appearance: alert, cooperative, no distress  Constitutional:Oriented to person, place, and time.appears well-developed and well-nourished.  HEENT: Normocephalic, atraumatic, neck symmetrical, no nasal discharge, left TM - bulging and erythemic, pos conjunctiva injections bilwith yellow discharge.  Lungs: clear to auscultation bilaterally, no dullness to percussion bilaterally  Heart: regular rate and rhythm without rub; no displacement of the PMI , S1&S2 present  Abdomen: soft, non-tender; bowel sounds normoactive; no organomegaly  Physical Exam    LABS:    Complete Blood Count  Lab Results   Component Value Date    RBC 4.85 03/27/2017    HGB 12.5 (L) 03/27/2017    HCT 39.1 (L) 03/27/2017    MCV 81 (L) 03/27/2017    MCH 25.8 (L) 03/27/2017    MCHC 32.0 03/27/2017    RDW 15.1 (H) 03/27/2017     03/27/2017    MPV 10.0 03/27/2017    GRAN 2.7 03/27/2017    GRAN 42.6 03/27/2017    LYMPH 3.1 03/27/2017    LYMPH 48.3 (H) 03/27/2017    MONO 0.5 03/27/2017    MONO 7.2 03/27/2017    EOS 0.1 03/27/2017    BASO 0.02 03/27/2017    EOSINOPHIL 1.6 03/27/2017    BASOPHIL 0.3 03/27/2017    DIFFMETHOD Automated 03/27/2017       Comprehensive Metabolic Panel  Lab Results   Component Value Date     03/27/2017    BUN 17 03/27/2017    CREATININE 0.8 " 03/27/2017     03/27/2017    K 4.1 03/27/2017     03/27/2017    PROT 6.8 03/27/2017    ALBUMIN 3.0 (L) 03/27/2017    BILITOT 0.6 03/27/2017    AST 18 03/27/2017    ALKPHOS 96 03/27/2017    CO2 29 03/27/2017    ALT 23 03/27/2017    ANIONGAP 6 (L) 03/27/2017    EGFRNONAA >60.0 03/27/2017    ESTGFRAFRICA >60.0 03/27/2017       LIPID  Lab Results   Component Value Date    CHOL 201 (H) 03/27/2017    HDL 63 03/27/2017         TSH  Lab Results   Component Value Date    TSH 1.225 03/27/2017       Current Outpatient Prescriptions   Medication Sig Dispense Refill    b complex vitamins tablet Take 1 tablet by mouth once daily.      CALCIUM CITRATE/VITAMIN D3 (CALCIUM CITRATE + D ORAL) Take 1,500 mg by mouth.      cyanocobalamin (VITAMIN B-12) 1000 MCG tablet Take 1,000 mcg by mouth once a week.       dulaglutide (TRULICITY) 1.5 mg/0.5 mL PnIj Inject 1.5 mg into the skin every 7 days. 2 mL 2    hydrocodone-acetaminophen 7.5-325mg (NORCO) 7.5-325 mg per tablet Take 1 tablet by mouth every 6 (six) hours as needed for Pain. 20 tablet 0    LANCETS & BLOOD GLUCOSE STRIPS MISC 2 (two) times daily.       phentermine (ADIPEX-P) 37.5 mg tablet 1/2 tab po daily 30 tablet 1    polyethylene glycol (GLYCOLAX) 17 gram/dose powder MIX ONE CAPFUL IN EIGHT OUNCE WATER TO TAKE BY MOUTH DAILY] 510 g 11    tadalafil (CIALIS) 20 MG Tab Take 1 tablet (20 mg total) by mouth as directed. 1 tab as needed every 36 hours 6 tablet 11    TRULICITY 1.5 mg/0.5 mL PnIj INJECT 0.5 ML (1.5mg) INTO THE SKIN EVERY SEVEN DAYS 2 mL 2    valsartan-hydrochlorothiazide (DIOVAN-HCT) 160-12.5 mg per tablet TAKE ONE TABLET BY MOUTH EVERY DAY 30 tablet 3    vitamin D 1000 units Tab Take by mouth once daily.      azithromycin (Z-ROBINA) 250 MG tablet Take 2 tablets by mouth on day 1; Take 1 tablet by mouth on days 2-5 6 tablet 0    BREEZE 2 TEST STRIPS Strp       erythromycin (ROMYCIN) ophthalmic ointment Place into both eyes 3 (three) times daily.  1 Tube 0    multivitamin with minerals tablet Take 1 tablet by mouth once daily.       No current facility-administered medications for this visit.        Assessment:    ICD-10-CM ICD-9-CM    1. Acute otitis media, unspecified laterality, unspecified otitis media type H66.90 382.9 azithromycin (Z-ROBINA) 250 MG tablet      erythromycin (ROMYCIN) ophthalmic ointment   2. Acute bacterial conjunctivitis of both eyes H10.33 372.03 erythromycin (ROMYCIN) ophthalmic ointment         Plan:    Return if symptoms worsen or fail to improve.          Zeenat Waldron MD

## 2017-08-28 ENCOUNTER — PATIENT MESSAGE (OUTPATIENT)
Dept: FAMILY MEDICINE | Facility: CLINIC | Age: 57
End: 2017-08-28

## 2017-09-19 ENCOUNTER — TELEPHONE (OUTPATIENT)
Dept: FAMILY MEDICINE | Facility: CLINIC | Age: 57
End: 2017-09-19

## 2017-09-19 DIAGNOSIS — E78.5 HYPERLIPIDEMIA, UNSPECIFIED HYPERLIPIDEMIA TYPE: Primary | ICD-10-CM

## 2017-09-19 DIAGNOSIS — I10 ESSENTIAL HYPERTENSION: ICD-10-CM

## 2017-09-19 DIAGNOSIS — D50.9 IRON DEFICIENCY ANEMIA, UNSPECIFIED IRON DEFICIENCY ANEMIA TYPE: ICD-10-CM

## 2017-09-19 NOTE — TELEPHONE ENCOUNTER
----- Message from Leighann Phelan sent at 9/19/2017  4:29 PM CDT -----  Contact: [phone]  Calling to get lab orders put in before appt this  Friday Sept 22

## 2017-09-20 ENCOUNTER — PATIENT MESSAGE (OUTPATIENT)
Dept: FAMILY MEDICINE | Facility: CLINIC | Age: 57
End: 2017-09-20

## 2017-09-21 ENCOUNTER — LAB VISIT (OUTPATIENT)
Dept: LAB | Facility: HOSPITAL | Age: 57
End: 2017-09-21
Attending: INTERNAL MEDICINE
Payer: COMMERCIAL

## 2017-09-21 DIAGNOSIS — Z11.59 NEED FOR HEPATITIS C SCREENING TEST: ICD-10-CM

## 2017-09-21 DIAGNOSIS — E78.5 HYPERLIPIDEMIA, UNSPECIFIED HYPERLIPIDEMIA TYPE: ICD-10-CM

## 2017-09-21 DIAGNOSIS — D50.9 IRON DEFICIENCY ANEMIA, UNSPECIFIED IRON DEFICIENCY ANEMIA TYPE: ICD-10-CM

## 2017-09-21 DIAGNOSIS — I10 ESSENTIAL HYPERTENSION: ICD-10-CM

## 2017-09-21 LAB
25(OH)D3+25(OH)D2 SERPL-MCNC: 33 NG/ML
ALBUMIN SERPL BCP-MCNC: 3.2 G/DL
ALP SERPL-CCNC: 103 U/L
ALT SERPL W/O P-5'-P-CCNC: 25 U/L
ANION GAP SERPL CALC-SCNC: 9 MMOL/L
AST SERPL-CCNC: 20 U/L
BASOPHILS # BLD AUTO: 0.02 K/UL
BASOPHILS NFR BLD: 0.2 %
BILIRUB SERPL-MCNC: 0.7 MG/DL
BUN SERPL-MCNC: 37 MG/DL
CALCIUM SERPL-MCNC: 9.2 MG/DL
CHLORIDE SERPL-SCNC: 103 MMOL/L
CO2 SERPL-SCNC: 26 MMOL/L
CREAT SERPL-MCNC: 1.8 MG/DL
DIFFERENTIAL METHOD: ABNORMAL
EOSINOPHIL # BLD AUTO: 0.1 K/UL
EOSINOPHIL NFR BLD: 1.3 %
ERYTHROCYTE [DISTWIDTH] IN BLOOD BY AUTOMATED COUNT: 15.9 %
EST. GFR  (AFRICAN AMERICAN): 47.2 ML/MIN/1.73 M^2
EST. GFR  (NON AFRICAN AMERICAN): 40.9 ML/MIN/1.73 M^2
ESTIMATED AVG GLUCOSE: 111 MG/DL
FERRITIN SERPL-MCNC: 124 NG/ML
GLUCOSE SERPL-MCNC: 105 MG/DL
HBA1C MFR BLD HPLC: 5.5 %
HCT VFR BLD AUTO: 37.2 %
HGB BLD-MCNC: 12.3 G/DL
IRON SERPL-MCNC: 96 UG/DL
LYMPHOCYTES # BLD AUTO: 3.7 K/UL
LYMPHOCYTES NFR BLD: 44.2 %
MCH RBC QN AUTO: 26 PG
MCHC RBC AUTO-ENTMCNC: 33.1 G/DL
MCV RBC AUTO: 79 FL
MONOCYTES # BLD AUTO: 0.7 K/UL
MONOCYTES NFR BLD: 8.9 %
NEUTROPHILS # BLD AUTO: 3.8 K/UL
NEUTROPHILS NFR BLD: 45.3 %
PLATELET # BLD AUTO: 334 K/UL
PMV BLD AUTO: 9.8 FL
POTASSIUM SERPL-SCNC: 4.3 MMOL/L
PROT SERPL-MCNC: 7.3 G/DL
RBC # BLD AUTO: 4.73 M/UL
SATURATED IRON: 27 %
SODIUM SERPL-SCNC: 138 MMOL/L
T4 FREE SERPL-MCNC: 0.88 NG/DL
TOTAL IRON BINDING CAPACITY: 360 UG/DL
TRANSFERRIN SERPL-MCNC: 243 MG/DL
TSH SERPL DL<=0.005 MIU/L-ACNC: 1.93 UIU/ML
VIT B12 SERPL-MCNC: 1649 PG/ML
WBC # BLD AUTO: 8.31 K/UL

## 2017-09-21 PROCEDURE — 83540 ASSAY OF IRON: CPT

## 2017-09-21 PROCEDURE — 85025 COMPLETE CBC W/AUTO DIFF WBC: CPT

## 2017-09-21 PROCEDURE — 86803 HEPATITIS C AB TEST: CPT

## 2017-09-21 PROCEDURE — 80053 COMPREHEN METABOLIC PANEL: CPT

## 2017-09-21 PROCEDURE — 83036 HEMOGLOBIN GLYCOSYLATED A1C: CPT

## 2017-09-21 PROCEDURE — 84443 ASSAY THYROID STIM HORMONE: CPT

## 2017-09-21 PROCEDURE — 84439 ASSAY OF FREE THYROXINE: CPT

## 2017-09-21 PROCEDURE — 36415 COLL VENOUS BLD VENIPUNCTURE: CPT | Mod: PO

## 2017-09-21 PROCEDURE — 82607 VITAMIN B-12: CPT

## 2017-09-21 PROCEDURE — 82728 ASSAY OF FERRITIN: CPT

## 2017-09-21 PROCEDURE — 82306 VITAMIN D 25 HYDROXY: CPT

## 2017-09-22 ENCOUNTER — OFFICE VISIT (OUTPATIENT)
Dept: FAMILY MEDICINE | Facility: CLINIC | Age: 57
End: 2017-09-22
Payer: COMMERCIAL

## 2017-09-22 VITALS
HEART RATE: 77 BPM | BODY MASS INDEX: 40.43 KG/M2 | OXYGEN SATURATION: 99 % | TEMPERATURE: 98 F | HEIGHT: 74 IN | SYSTOLIC BLOOD PRESSURE: 100 MMHG | WEIGHT: 315 LBS | DIASTOLIC BLOOD PRESSURE: 60 MMHG

## 2017-09-22 DIAGNOSIS — N13.8 BPH WITH URINARY OBSTRUCTION: ICD-10-CM

## 2017-09-22 DIAGNOSIS — N28.9 RENAL INSUFFICIENCY: ICD-10-CM

## 2017-09-22 DIAGNOSIS — N40.1 BPH WITH URINARY OBSTRUCTION: ICD-10-CM

## 2017-09-22 DIAGNOSIS — E11.9 CONTROLLED TYPE 2 DIABETES MELLITUS WITHOUT COMPLICATION, WITHOUT LONG-TERM CURRENT USE OF INSULIN: Primary | Chronic | ICD-10-CM

## 2017-09-22 DIAGNOSIS — I10 ESSENTIAL HYPERTENSION: ICD-10-CM

## 2017-09-22 DIAGNOSIS — N52.9 ED (ERECTILE DYSFUNCTION) OF ORGANIC ORIGIN: ICD-10-CM

## 2017-09-22 DIAGNOSIS — E66.01 MORBID OBESITY WITH BMI OF 45.0-49.9, ADULT: ICD-10-CM

## 2017-09-22 LAB — HCV AB SERPL QL IA: NEGATIVE

## 2017-09-22 PROCEDURE — 3078F DIAST BP <80 MM HG: CPT | Mod: S$GLB,,, | Performed by: NURSE PRACTITIONER

## 2017-09-22 PROCEDURE — 3074F SYST BP LT 130 MM HG: CPT | Mod: S$GLB,,, | Performed by: NURSE PRACTITIONER

## 2017-09-22 PROCEDURE — 99999 PR PBB SHADOW E&M-EST. PATIENT-LVL IV: CPT | Mod: PBBFAC,,, | Performed by: NURSE PRACTITIONER

## 2017-09-22 PROCEDURE — 3008F BODY MASS INDEX DOCD: CPT | Mod: S$GLB,,, | Performed by: NURSE PRACTITIONER

## 2017-09-22 PROCEDURE — 99215 OFFICE O/P EST HI 40 MIN: CPT | Mod: S$GLB,,, | Performed by: NURSE PRACTITIONER

## 2017-09-22 RX ORDER — TADALAFIL 20 MG/1
TABLET ORAL
Qty: 9 TABLET | Refills: 11 | Status: SHIPPED | OUTPATIENT
Start: 2017-09-22

## 2017-09-22 RX ORDER — VALSARTAN 160 MG/1
160 TABLET ORAL DAILY
Qty: 30 TABLET | Refills: 3 | Status: SHIPPED | OUTPATIENT
Start: 2017-09-22 | End: 2017-11-09

## 2017-09-22 RX ORDER — VALSARTAN 160 MG/1
160 TABLET ORAL DAILY
Qty: 90 TABLET | Refills: 3 | Status: SHIPPED | OUTPATIENT
Start: 2017-09-22 | End: 2017-09-22 | Stop reason: SDUPTHER

## 2017-09-22 NOTE — PROGRESS NOTES
This dictation has been generated using Dragon Dictation some phonetic errors may occur.     Washington was seen today for diabetes.    Diagnoses and all orders for this visit:    Controlled type 2 diabetes mellitus without complication, without long-term current use of insulin  -     Basic metabolic panel; Future    Essential hypertension  -     Basic metabolic panel; Future    Renal insufficiency  -     Basic metabolic panel; Future    BPH with urinary obstruction  -     tadalafil (CIALIS) 20 MG Tab; 1 tab as needed every 36 hours as needed.    ED (erectile dysfunction) of organic origin  -     tadalafil (CIALIS) 20 MG Tab; 1 tab as needed every 36 hours as needed.    Other orders  -     Discontinue: valsartan (DIOVAN) 160 MG tablet; Take 1 tablet (160 mg total) by mouth once daily.  -     valsartan (DIOVAN) 160 MG tablet; Take 1 tablet (160 mg total) by mouth once daily.     diabetes bending onset renal insufficiency.  We suspect symptoms are due to dehydration.  Increase in creatinine noted.  Patient notes increased thirst but blood sugar has been controlled.  Stop hydrochlorothiazide and repeat BMP.  He will be moving to [place] can get the labs done there and faxed to us.  He does need to establish care with PCP in [place].  BPH urinary obstruction erectile dysfunction requests Cialis refilled        No Follow-up on file.      ________________________________________________________________  ________________________________________________________________        Chief Complaint   Patient presents with    Diabetes     History of present illness  This 57 y.o. presents today for complaint of follow-up on lab.  Patient has diabetes hypertension.  Renal markers have been controlled until recently.  Patient had an increase in creatinine from 0.8-1.8.  No change in medicine.  He has managed to lose weight.  His diabetes has been well controlled with A1c 5.5.  Patient indicates that he has been changing his diet.  He did  see the dietitian recently.  He notes increased thirst.  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  No new meds.  Hypertension controlled on low bit low at today's visit.  We will be discontinuing hydrochlorothiazide and a vascular markings blood pressure at home.  He denies any symptomatology.  BPH patient requests refill of Cialis for ED symptoms.    Answers for HPI/ROS submitted by the patient on 9/22/2017   Diabetes problem  MedicAlert ID: No  Disease duration: 19 years  blurred vision: No  chest pain: No  fatigue: Yes  foot paresthesias: No  foot ulcerations: No  polydipsia: No  polyphagia: No  polyuria: No  visual change: No  weakness: No  weight loss: No  Symptom course: stable  confusion: No  dizziness: No  headaches: No  hunger: No  mood changes: No  nervous/anxious: No  pallor: No  seizures: No  sleepiness: Yes  speech difficulty: No  sweats: No  tremors: No  blackouts: No  hospitalization: No  nocturnal hypoglycemia: No  required assistance: No  required glucagon: No  CVA: No  heart disease: No  impotence: Yes  nephropathy: No  peripheral neuropathy: No  PVD: No  retinopathy: No  autonomic neuropathy: No  Current treatments: none  Treatment compliance: all of the time  Home urines: 1-2 x per week  Monitoring compliance: excellent  Blood glucose trend: decreasing steadily  Weight trend: fluctuating minimally  Current diet: diabetic  Meal planning: avoidance of concentrated sweets  Exercise: intermittently  Dietitian visit: Yes  Eye exam current: Yes  Sees podiatrist: No  Reviewed histories.     Past Medical History:   Diagnosis Date    Arthritis     Colon polyp     Diabetes mellitus     Diabetes mellitus type II, uncontrolled 2/19/2013    ED (erectile dysfunction) 1/29/2014    History of bariatric surgery 1/29/2014    History of colonic polyps 10/17/2012    HTN (hypertension) 1/29/2014    Hyperlipidemia 5/16/2013    Hypertension     Iron deficiency anemia 2/19/2013    Knee pain, bilateral  7/12/2013    Lymphedema 7/12/2013    Morbid obesity 2/19/2013    Sleep apnea 2/19/2013    Vitamin D deficiency disease 2/19/2013       Past Surgical History:   Procedure Laterality Date    COLONOSCOPY      GASTRIC BYPASS  5/24/2013    [facility]       Family History   Problem Relation Age of Onset    Cataracts Father     Prostate cancer Father     Colon polyps Mother     Cataracts Mother     Cancer Maternal Aunt      colon    No Known Problems Sister     Diabetes Brother     Diabetes Brother     Amblyopia Neg Hx     Blindness Neg Hx     Glaucoma Neg Hx     Hypertension Neg Hx     Macular degeneration Neg Hx     Retinal detachment Neg Hx     Strabismus Neg Hx     Stroke Neg Hx     Thyroid disease Neg Hx        Social History     Social History    Marital status:      Spouse name: N/A    Number of children: N/A    Years of education: N/A     Social History Main Topics    Smoking status: Never Smoker    Smokeless tobacco: Never Used    Alcohol use No    Drug use: No    Sexual activity: Not Asked     Other Topics Concern    None     Social History Narrative    None       Current Outpatient Prescriptions   Medication Sig Dispense Refill    b complex vitamins tablet Take 1 tablet by mouth once daily.      BREEZE 2 TEST STRIPS Strp       CALCIUM CITRATE/VITAMIN D3 (CALCIUM CITRATE + D ORAL) Take 1,500 mg by mouth.      cyanocobalamin (VITAMIN B-12) 1000 MCG tablet Take 1,000 mcg by mouth once a week.       dulaglutide (TRULICITY) 1.5 mg/0.5 mL PnIj Inject 1.5 mg into the skin every 7 days. 2 mL 2    erythromycin (ROMYCIN) ophthalmic ointment Place into both eyes 3 (three) times daily. 1 Tube 0    hydrocodone-acetaminophen 7.5-325mg (NORCO) 7.5-325 mg per tablet Take 1 tablet by mouth every 6 (six) hours as needed for Pain. 20 tablet 0    LANCETS & BLOOD GLUCOSE STRIPS MISC 2 (two) times daily.       multivitamin with minerals tablet Take 1 tablet by mouth once daily.       phentermine (ADIPEX-P) 37.5 mg tablet 1/2 tab po daily 30 tablet 1    polyethylene glycol (GLYCOLAX) 17 gram/dose powder MIX ONE CAPFUL IN EIGHT OUNCE WATER TO TAKE BY MOUTH DAILY] 510 g 11    tadalafil (CIALIS) 20 MG Tab 1 tab as needed every 36 hours as needed. 9 tablet 11    TRULICITY 1.5 mg/0.5 mL PnIj INJECT 0.5 ML (1.5mg) INTO THE SKIN EVERY SEVEN DAYS 2 mL 2    vitamin D 1000 units Tab Take by mouth once daily.      valsartan (DIOVAN) 160 MG tablet Take 1 tablet (160 mg total) by mouth once daily. 30 tablet 3     No current facility-administered medications for this visit.        Review of patient's allergies indicates:   Allergen Reactions    Mobic [meloxicam] Other (See Comments)     High risk  For ulcers    Morphine Itching    Nsaids (non-steroidal anti-inflammatory drug)        Physical examination  Vitals Reviewed  Gen. Well-dressed well-nourished    Skin warm dry and intact.  No rashes noted.  Neck is supple without adenopathy  Chest.  Respirations are even unlabored.  Lungs are clear to auscultation.  Cardiac regular rate and rhythm.  No chest wall adenopathy noted.  Neuro. Awake alert oriented x4.  Normal judgment and cognition noted.  Extremities no clubbing cyanosis or edema noted.     Call or return to clinic prn if these symptoms worsen or fail to improve as anticipated.

## 2017-09-22 NOTE — TELEPHONE ENCOUNTER
----- Message from Karen Armenta sent at 9/22/2017 11:18 AM CDT -----  Contact: self   Washington States that he needs a call returned by a nurse in reference to needing a refill ( phentermine (ADIPEX-P) 37.5 mg tablet) and he is relocating to [place] and is almost out.                         Please call Washington @  [phone] . Thanks :)

## 2017-09-27 RX ORDER — PHENTERMINE HYDROCHLORIDE 37.5 MG/1
TABLET ORAL
Qty: 30 TABLET | Refills: 1 | OUTPATIENT
Start: 2017-09-27

## 2017-10-02 ENCOUNTER — PATIENT MESSAGE (OUTPATIENT)
Dept: FAMILY MEDICINE | Facility: CLINIC | Age: 57
End: 2017-10-02

## 2017-10-02 DIAGNOSIS — I10 ESSENTIAL HYPERTENSION: Primary | ICD-10-CM

## 2017-10-11 ENCOUNTER — LAB VISIT (OUTPATIENT)
Dept: LAB | Facility: HOSPITAL | Age: 57
End: 2017-10-11
Attending: INTERNAL MEDICINE
Payer: COMMERCIAL

## 2017-10-11 DIAGNOSIS — I10 ESSENTIAL HYPERTENSION: ICD-10-CM

## 2017-10-11 LAB
ANION GAP SERPL CALC-SCNC: 8 MMOL/L
BUN SERPL-MCNC: 21 MG/DL
CALCIUM SERPL-MCNC: 9.1 MG/DL
CHLORIDE SERPL-SCNC: 103 MMOL/L
CO2 SERPL-SCNC: 27 MMOL/L
CREAT SERPL-MCNC: 1.1 MG/DL
EST. GFR  (AFRICAN AMERICAN): >60 ML/MIN/1.73 M^2
EST. GFR  (NON AFRICAN AMERICAN): >60 ML/MIN/1.73 M^2
GLUCOSE SERPL-MCNC: 90 MG/DL
POTASSIUM SERPL-SCNC: 4.1 MMOL/L
SODIUM SERPL-SCNC: 138 MMOL/L

## 2017-10-11 PROCEDURE — 80048 BASIC METABOLIC PNL TOTAL CA: CPT

## 2017-10-11 PROCEDURE — 36415 COLL VENOUS BLD VENIPUNCTURE: CPT | Mod: PO

## 2017-10-19 ENCOUNTER — PATIENT MESSAGE (OUTPATIENT)
Dept: FAMILY MEDICINE | Facility: CLINIC | Age: 57
End: 2017-10-19

## 2017-11-07 ENCOUNTER — TELEPHONE (OUTPATIENT)
Dept: FAMILY MEDICINE | Facility: CLINIC | Age: 57
End: 2017-11-07

## 2017-11-07 NOTE — TELEPHONE ENCOUNTER
----- Message from Lexi Reis sent at 11/7/2017  7:44 AM CST -----  Patient is calling to follow up on handicap form request. He faxed the [place] form to us. Please call patient at [phone] or [phone] Thank you!

## 2017-11-08 ENCOUNTER — TELEPHONE (OUTPATIENT)
Dept: BARIATRICS | Facility: CLINIC | Age: 57
End: 2017-11-08

## 2017-11-09 ENCOUNTER — OFFICE VISIT (OUTPATIENT)
Dept: BARIATRICS | Facility: CLINIC | Age: 57
End: 2017-11-09
Payer: COMMERCIAL

## 2017-11-09 VITALS
DIASTOLIC BLOOD PRESSURE: 86 MMHG | BODY MASS INDEX: 40.43 KG/M2 | WEIGHT: 315 LBS | HEART RATE: 80 BPM | HEIGHT: 74 IN | SYSTOLIC BLOOD PRESSURE: 138 MMHG

## 2017-11-09 DIAGNOSIS — I10 ESSENTIAL HYPERTENSION: ICD-10-CM

## 2017-11-09 DIAGNOSIS — E11.9 CONTROLLED TYPE 2 DIABETES MELLITUS WITHOUT COMPLICATION, WITHOUT LONG-TERM CURRENT USE OF INSULIN: Chronic | ICD-10-CM

## 2017-11-09 DIAGNOSIS — E66.01 MORBID OBESITY WITH BMI OF 40.0-44.9, ADULT: ICD-10-CM

## 2017-11-09 PROCEDURE — 99999 PR PBB SHADOW E&M-EST. PATIENT-LVL III: CPT | Mod: PBBFAC,,, | Performed by: INTERNAL MEDICINE

## 2017-11-09 PROCEDURE — 99213 OFFICE O/P EST LOW 20 MIN: CPT | Mod: S$GLB,,, | Performed by: INTERNAL MEDICINE

## 2017-11-09 RX ORDER — PHENTERMINE HYDROCHLORIDE 37.5 MG/1
37.5 TABLET ORAL
Qty: 30 TABLET | Refills: 2 | Status: SHIPPED | OUTPATIENT
Start: 2017-11-09 | End: 2017-12-09

## 2017-11-09 RX ORDER — VALSARTAN AND HYDROCHLOROTHIAZIDE 160; 12.5 MG/1; MG/1
TABLET, FILM COATED ORAL
COMMUNITY
Start: 2017-10-14 | End: 2017-11-09

## 2017-11-09 RX ORDER — AZITHROMYCIN 250 MG/1
TABLET, FILM COATED ORAL
Refills: 0 | COMMUNITY
Start: 2017-08-23 | End: 2017-11-09

## 2017-11-09 NOTE — PATIENT INSTRUCTIONS
Patient warned of common side effects of phentermine including anxiety, insomnia, palpitations and increased blood pressure. It was also explained that it is for short-term usage along with diet and exercise, and that stopping the medication without making lifestyle changes will result in regain of weight. Patient states understanding.     Weight loss medications are controlled substances.  They require routine follow up. Prescription or pills that are lost or destroyed will not be replaced.     Sample meal plan  80-120g protein; 7880-4532 calories  Protein drinks and bars: 0-4 grams sugar  Drink lots of water throughout the day and exercise!  MENU # 1  Breakfast: 2 eggs, 1 turkey sausage brandyn  Lunch: 2-3 roll-ups (sliced turkey, low-fat slice cheese), baby carrots dipped in 1 Tbsp natural peanut butter  Mid-Day Snack: ¼ cup unsalted almonds, ½ cup fruit  Supper: 1 chicken thigh simmered in tomato sauce + 2 Tbsp mozzarella cheese, ½ cup black beans, 1/2 cup steamed veggies  Evening Snack: 1/2 cup grapes with 4 cubes low-fat cheddar cheese   ___________________________________________________  MENU # 2  Breakfast: protein drink  Mid-morning snack : ¼ cup unsalted nuts  Lunch: 1 cup tuna or chicken salad made with light gonzales, over salad.   Supper: hamburger brandyn, slice of cheese, 1 cup steamed veggies.   Snack: light yogurt      Menu #3  Breakfast: 6oz plain Greek yogurt + fruit (½ banana, ½ cup fruit - pineapple, blueberries, strawberries, peach), may add Splenda to seferino.  Lunch: ½  chicken breast w/ slice pepper pura cheese, 1/2 cup steamed veggies and small salad with Salad Spritzer.    Mid-Day snack: protein drink   Supper: 4oz Grilled fish, grilled veggie kabob ( mushrooms, onion, bell pepper, yellow squash, zucchini, cherry tomatoes)  Evening Snack: fudgsicle no-sugar-added    Menu # 4  Breakfast: vanilla iced coffee: 1 scoop vanilla protein powder + 4oz skim milk + 4oz coffee   Snack: protein bar  Lunch: 2  Lettuce tacos: ¼ cup seasoned ground turkey wrapped in a Juan F lettuce leaf with 1 Tbsp shredded cheese and dollop low-fat sour cream  Dinner: Shrimp omelet: 2 eggs, ½ cup shrimp, green onions, and shredded cheese        Menu #5  Breakfast: 1 cup low-fat cottage cheese, ½ cup peaches (no sugar added)  Lunch: 2 oz baked pork chop, 1 cup okra and tomato stew  Snack: 1 cup black beans + salsa + dollop sour cream  Dinner: Caprese chicken salad: 2 oz chicken, 1oz fresh mozzarella, sliced tomato, 1 Tbsp olive oil, basil  Snack: sugar-free pudding cup      Menu #6  Breakfast: ½ cup part-skim ricotta cheese, 2 Tbsp sugar-free strawberry preserves, ¼ cup slivered almonds  Lunch: 2 oz canned chicken, 1oz shredded cheddar cheese, ½ cup black beans, 2 Tbsp salsa  Snack: Protein drink  Dinner: 4 oz grilled salmon steak, over mixed green salad with light dressing            Fruits and Vegetables       Include 1-2 servings of fruit daily.      1 serving of fruit includes ½ cup unsweetened applesauce, ½ medium banana, tennis ball size piece of fruit, 17 grapes, 1 cup melon, 1 cup strawberries, ¼ cup dried fruit     Include 2-3 servings of vegetables daily. 1 serving is 1 cup raw or ½ cup cooked.     Non-starchy vegetables include artichoke, asparagus, baby corn, bamboo shoots, beans: green/Italian/wax, bean sprouts, beets, broccoli, [place] sprouts, cabbage, carrots, cauliflower, celery, cucumber, eggplant, green onions or scallions, greens, jicama, leeks, mushrooms, okra, onions, pea pods, peppers, radishes, spinach, summer squash, tomatoes and salsa, turnips, vegetable juice cocktail, water chestnuts, zucchini

## 2017-11-09 NOTE — PROGRESS NOTES
"Subjective:       Patient ID: Washington Manzo is a 57 y.o. male.    Chief Complaint: Follow-up    Pt here today for follow-up. Has lost 21 more lbs, 28 lbs in total. Started trulicity with 1500 aurelia diet. BS has been around 95- 100. Some episodes of hypoglycemia, but possibly skipped a meal.   He feels appetite was down initially with the trulicity. Had some Nausea initially, but went away. Feels full. Added phentermine last OV. He took 1 pill at a point and felt that was more helpful with the appetite. He is off  all BP meds.     Lab Results       Component                Value               Date                       HGBA1C                   5.5                 09/21/2017                 HGBA1C                   6.0                 03/27/2017                 HGBA1C                   5.7                 09/27/2016            Lab Results       Component                Value               Date                       LDLCALC                  114.4               03/27/2017                 CREATININE               1.1                 10/11/2017                      Review of Systems   Constitutional: Negative for chills and fever.   Respiratory: Negative for apnea and shortness of breath.    Cardiovascular: Positive for leg swelling. Negative for chest pain.   Gastrointestinal: Positive for constipation. Negative for diarrhea.        Denies HB   Endocrine:        Occasional hypoglycemia   Genitourinary: Negative for difficulty urinating and dysuria.   Musculoskeletal: Positive for arthralgias. Negative for back pain.        Knees and ankles   Neurological: Negative for dizziness and light-headedness.   Psychiatric/Behavioral: Negative for dysphoric mood. The patient is not nervous/anxious.        Objective:     /86   Pulse 80   Ht 6' 2" (1.88 m)   Wt (!) 146.6 kg (323 lb 3.1 oz)   BMI 41.50 kg/m²     Physical Exam   Constitutional: He is oriented to person, place, and time. He appears well-developed. No " distress.   Morbidly obese     HENT:   Head: Normocephalic and atraumatic.   Eyes: Pupils are equal, round, and reactive to light. No scleral icterus.   Neck: Normal range of motion. Neck supple.   Cardiovascular: Normal rate, regular rhythm and normal heart sounds.  Exam reveals no gallop and no friction rub.    No murmur heard.  Pulmonary/Chest: Effort normal and breath sounds normal. No respiratory distress. He has no wheezes.   Musculoskeletal: Normal range of motion.   Lymphedema LLE.    Neurological: He is alert and oriented to person, place, and time.   Skin: Skin is warm and dry.   Psychiatric: He has a normal mood and affect. His behavior is normal. Judgment normal.   Vitals reviewed.      Assessment:       1. Essential hypertension    2. Controlled type 2 diabetes mellitus without complication, without long-term current use of insulin    3. Morbid obesity with BMI of 40.0-44.9, adult        Plan:         1. Essential hypertension  Appears to be doing well with weight loss and lifestyle changes. Continue to monitor.     2. Controlled type 2 diabetes mellitus without complication, without long-term current use of insulin  Continue Trulicity. Printed RF given.     3. Morbid obesity with BMI of 40.0-44.9, adult  Doing well.   - phentermine (ADIPEX-P) 37.5 mg tablet; Take 1 tablet (37.5 mg total) by mouth before breakfast.  Dispense: 30 tablet; Refill: 2      Nutrition materials provided today.  1200 aurelia menu given.     Patient warned of common side effects of phentermine including anxiety, insomnia, palpitations and increased blood pressure. It was also explained that it is for short-term usage along with diet and exercise, and that stopping the medication without making lifestyle changes will result in regain of weight. Patient states understanding.     Weight loss medications are controlled substances.  They require routine follow up. Prescription or pills that are lost or destroyed will not be replaced.

## 2017-11-28 ENCOUNTER — TELEPHONE (OUTPATIENT)
Dept: FAMILY MEDICINE | Facility: CLINIC | Age: 57
End: 2017-11-28

## 2017-11-28 NOTE — TELEPHONE ENCOUNTER
No problem but patient needs to give us the specific information that the prescription needs to say since I know from the past experience that he needs special compression stockings.    Patient may need to look up how the stockings were ordered in the past    [facility]ager in [place], also likely needs to give us where he wants the prescription sent

## 2017-11-28 NOTE — TELEPHONE ENCOUNTER
Spoke with patient.  States history of lymphedema.  Reports bilateral leg swelling, left greater than right.  Requesting prescription for compression stockings.  Recently moved to [place] last month.  LOV 9/22/17.

## 2017-11-28 NOTE — TELEPHONE ENCOUNTER
----- Message from Clint Gao sent at 11/28/2017 10:15 AM CST -----  Contact: Self  Pt called to request Rx for compression stockings. Pt can be reached @  [phone] or [phone].

## 2017-11-29 ENCOUNTER — PATIENT MESSAGE (OUTPATIENT)
Dept: FAMILY MEDICINE | Facility: CLINIC | Age: 57
End: 2017-11-29

## 2017-12-04 NOTE — TELEPHONE ENCOUNTER
Spoke with the patient.  Patient states would like prescription mailed to him.  Requesting standard prescription for Jobst Compression Garments for left and right legs.  Asking that prescription is mailed to him in [place], [address] [place]  [address].  Patient states will go to [facility] to get measured.

## 2017-12-19 NOTE — PROGRESS NOTES
Patient informed of results and verbalized understanding.   Subjective:       Patient ID: Washington Manzo is a 56 y.o. male.    Chief Complaint: Benign Prostatic Hypertrophy (prostate  check today.)    HPI patient is here for PSA which has not been done yet.  Is voiding well.  His father had prostate cancer but  from other issues.  No complaints    Past Medical History:   Diagnosis Date    Arthritis     Colon polyp     Diabetes mellitus     Diabetes mellitus type II, uncontrolled 2013    ED (erectile dysfunction) 2014    History of bariatric surgery 2014    History of colonic polyps 10/17/2012    HTN (hypertension) 2014    Hyperlipidemia 2013    Hypertension     Iron deficiency anemia 2013    Knee pain, bilateral 2013    Lymphedema 2013    Morbid obesity 2013    Sleep apnea 2013    Vitamin D deficiency disease 2013       Past Surgical History:   Procedure Laterality Date    COLONOSCOPY      GASTRIC BYPASS  2013    [facility]       Family History   Problem Relation Age of Onset    Cancer Father      prostate    Cataracts Father     Colon polyps Mother     Cataracts Mother     Cancer Maternal Aunt      colon    Amblyopia Neg Hx     Blindness Neg Hx     Diabetes Neg Hx     Glaucoma Neg Hx     Hypertension Neg Hx     Macular degeneration Neg Hx     Retinal detachment Neg Hx     Strabismus Neg Hx     Stroke Neg Hx     Thyroid disease Neg Hx        Social History     Social History    Marital status:      Spouse name: N/A    Number of children: N/A    Years of education: N/A     Occupational History    Not on file.     Social History Main Topics    Smoking status: Never Smoker    Smokeless tobacco: Never Used    Alcohol use No    Drug use: No    Sexual activity: Not on file     Other Topics Concern    Not on file     Social History Narrative       Allergies:  Mobic [meloxicam] and Morphine    Medications:    Current Outpatient Prescriptions:     b complex vitamins tablet,  Take 1 tablet by mouth once daily., Disp: , Rfl:     BREEZE 2 TEST STRIPS Strp, , Disp: , Rfl:     CALCIUM CITRATE/VITAMIN D3 (CALCIUM CITRATE + D ORAL), Take 1,500 mg by mouth., Disp: , Rfl:     cyanocobalamin (VITAMIN B-12) 1000 MCG tablet, Take 1,000 mcg by mouth once a week. , Disp: , Rfl:     LANCETS & BLOOD GLUCOSE STRIPS MISC, 2 (two) times daily. , Disp: , Rfl:     polyethylene glycol (GLYCOLAX) 17 gram/dose powder, TAKE ONE CAPFUL mixed IN WATER BY MOUTH DAILY, Disp: 527 g, Rfl: 1    tadalafil (CIALIS) 20 MG Tab, Take 1 tablet (20 mg total) by mouth as directed. 1 tab as needed every 36 hours, Disp: 6 tablet, Rfl: 11    valsartan-hydrochlorothiazide (DIOVAN-HCT) 160-12.5 mg per tablet, TAKE ONE TABLET BY MOUTH EVERY DAY, Disp: 30 tablet, Rfl: 3    vitamin D 1000 units Tab, Take by mouth once daily., Disp: , Rfl:     hydrocodone-acetaminophen 7.5-325mg (NORCO) 7.5-325 mg per tablet, Take 1 tablet by mouth every 6 (six) hours as needed for Pain., Disp: 20 tablet, Rfl: 0    multivitamin with minerals tablet, Take 1 tablet by mouth once daily., Disp: , Rfl:     Review of Systems   Constitutional: Negative for activity change, appetite change, chills, diaphoresis, fatigue, fever and unexpected weight change.   HENT: Negative for congestion, dental problem, hearing loss, mouth sores, postnasal drip, rhinorrhea, sinus pressure and trouble swallowing.    Eyes: Negative for pain, discharge and itching.   Respiratory: Negative for apnea, cough, choking, chest tightness, shortness of breath and wheezing.    Cardiovascular: Negative for chest pain, palpitations and leg swelling.   Gastrointestinal: Negative for abdominal distention, abdominal pain, anal bleeding, blood in stool, constipation, diarrhea, nausea, rectal pain and vomiting.   Endocrine: Negative for polydipsia and polyuria.   Genitourinary: Negative for decreased urine volume, difficulty urinating, discharge, dysuria, enuresis, flank pain,  frequency, genital sores, hematuria, penile pain, penile swelling, scrotal swelling, testicular pain and urgency.   Musculoskeletal: Negative for arthralgias, back pain and myalgias.   Skin: Negative for color change, rash and wound.   Neurological: Negative for dizziness, syncope, speech difficulty, light-headedness and headaches.   Hematological: Negative for adenopathy. Does not bruise/bleed easily.   Psychiatric/Behavioral: Negative for behavioral problems, confusion, hallucinations and sleep disturbance.       Objective:      Physical Exam   Constitutional: He appears well-developed.   HENT:   Head: Normocephalic.   Cardiovascular: Normal rate.    Pulmonary/Chest: Effort normal.   Abdominal: Soft.   Genitourinary: Prostate normal.   Genitourinary Comments: 25 g benign   Neurological: He is alert.   Skin: Skin is warm.     Psychiatric: He has a normal mood and affect.       Assessment:       1. Family history of prostate cancer        Plan:       Washington was seen today for benign prostatic hypertrophy.    Diagnoses and all orders for this visit:    Family history of prostate cancer  -     Prostate Specific Antigen, Diagnostic; Future        phone review PSA     Return clinic 1 year with PSA

## 2018-02-13 ENCOUNTER — PATIENT MESSAGE (OUTPATIENT)
Dept: FAMILY MEDICINE | Facility: CLINIC | Age: 58
End: 2018-02-13

## 2018-02-13 DIAGNOSIS — Z12.12 SCREENING FOR COLORECTAL CANCER: Primary | ICD-10-CM

## 2018-02-13 DIAGNOSIS — Z12.11 SCREENING FOR COLORECTAL CANCER: Primary | ICD-10-CM

## 2018-02-14 ENCOUNTER — PATIENT MESSAGE (OUTPATIENT)
Dept: FAMILY MEDICINE | Facility: CLINIC | Age: 58
End: 2018-02-14

## 2018-02-16 ENCOUNTER — TELEPHONE (OUTPATIENT)
Dept: ENDOSCOPY | Facility: HOSPITAL | Age: 58
End: 2018-02-16

## 2018-02-16 DIAGNOSIS — Z12.11 SPECIAL SCREENING FOR MALIGNANT NEOPLASMS, COLON: Primary | ICD-10-CM

## 2018-02-16 RX ORDER — SODIUM, POTASSIUM,MAG SULFATES 17.5-3.13G
SOLUTION, RECONSTITUTED, ORAL ORAL
Qty: 1 BOTTLE | Refills: 0 | Status: ON HOLD | OUTPATIENT
Start: 2018-02-16 | End: 2018-03-12 | Stop reason: HOSPADM

## 2018-03-08 ENCOUNTER — TELEPHONE (OUTPATIENT)
Dept: BARIATRICS | Facility: CLINIC | Age: 58
End: 2018-03-08

## 2018-03-08 NOTE — TELEPHONE ENCOUNTER
Returned phone call to patient to schedule his follow up appt. Left voicemail to give me a callback when he has a chance to make a his follow up appt.

## 2018-03-08 NOTE — TELEPHONE ENCOUNTER
----- Message from Yousif Aburto sent at 3/8/2018 11:17 AM CST -----  [phone]//pt states that he is in town and trying to be see if all possible//please call/thank you

## 2018-03-12 ENCOUNTER — HOSPITAL ENCOUNTER (OUTPATIENT)
Facility: HOSPITAL | Age: 58
Discharge: HOME | End: 2018-03-12
Attending: INTERNAL MEDICINE | Admitting: INTERNAL MEDICINE
Payer: COMMERCIAL

## 2018-03-12 ENCOUNTER — ANESTHESIA EVENT (OUTPATIENT)
Dept: ENDOSCOPY | Facility: HOSPITAL | Age: 58
End: 2018-03-12
Payer: COMMERCIAL

## 2018-03-12 ENCOUNTER — SURGERY (OUTPATIENT)
Age: 58
End: 2018-03-12

## 2018-03-12 ENCOUNTER — ANESTHESIA (OUTPATIENT)
Dept: ENDOSCOPY | Facility: HOSPITAL | Age: 58
End: 2018-03-12
Payer: COMMERCIAL

## 2018-03-12 VITALS
OXYGEN SATURATION: 100 % | DIASTOLIC BLOOD PRESSURE: 66 MMHG | RESPIRATION RATE: 20 BRPM | SYSTOLIC BLOOD PRESSURE: 142 MMHG | HEART RATE: 77 BPM | WEIGHT: 312 LBS | TEMPERATURE: 98 F | BODY MASS INDEX: 46.21 KG/M2 | HEIGHT: 69 IN

## 2018-03-12 DIAGNOSIS — Z12.11 COLON CANCER SCREENING: Primary | ICD-10-CM

## 2018-03-12 LAB — POCT GLUCOSE: 108 MG/DL (ref 70–110)

## 2018-03-12 PROCEDURE — 63600175 PHARM REV CODE 636 W HCPCS: Performed by: NURSE ANESTHETIST, CERTIFIED REGISTERED

## 2018-03-12 PROCEDURE — D9220A PRA ANESTHESIA: Mod: CRNA,,, | Performed by: NURSE ANESTHETIST, CERTIFIED REGISTERED

## 2018-03-12 PROCEDURE — 37000008 HC ANESTHESIA 1ST 15 MINUTES: Performed by: INTERNAL MEDICINE

## 2018-03-12 PROCEDURE — D9220A PRA ANESTHESIA: Mod: ANES,,, | Performed by: ANESTHESIOLOGY

## 2018-03-12 PROCEDURE — 37000009 HC ANESTHESIA EA ADD 15 MINS: Performed by: INTERNAL MEDICINE

## 2018-03-12 PROCEDURE — G0105 COLORECTAL SCRN; HI RISK IND: HCPCS | Mod: ,,, | Performed by: INTERNAL MEDICINE

## 2018-03-12 PROCEDURE — 82962 GLUCOSE BLOOD TEST: CPT | Performed by: INTERNAL MEDICINE

## 2018-03-12 PROCEDURE — 25000003 PHARM REV CODE 250: Performed by: INTERNAL MEDICINE

## 2018-03-12 PROCEDURE — G0105 COLORECTAL SCRN; HI RISK IND: HCPCS | Performed by: INTERNAL MEDICINE

## 2018-03-12 RX ORDER — PHENTERMINE HYDROCHLORIDE 37.5 MG/1
37.5 TABLET ORAL
COMMUNITY

## 2018-03-12 RX ORDER — SODIUM CHLORIDE 9 MG/ML
INJECTION, SOLUTION INTRAVENOUS CONTINUOUS
Status: DISCONTINUED | OUTPATIENT
Start: 2018-03-12 | End: 2018-03-12 | Stop reason: HOSPADM

## 2018-03-12 RX ORDER — PROPOFOL 10 MG/ML
INJECTION, EMULSION INTRAVENOUS
Status: DISCONTINUED | OUTPATIENT
Start: 2018-03-12 | End: 2018-03-12

## 2018-03-12 RX ORDER — PROPOFOL 10 MG/ML
INJECTION, EMULSION INTRAVENOUS CONTINUOUS PRN
Status: DISCONTINUED | OUTPATIENT
Start: 2018-03-12 | End: 2018-03-12

## 2018-03-12 RX ORDER — LIDOCAINE HCL/PF 100 MG/5ML
SYRINGE (ML) INTRAVENOUS
Status: DISCONTINUED | OUTPATIENT
Start: 2018-03-12 | End: 2018-03-12

## 2018-03-12 RX ADMIN — PROPOFOL 175 MCG/KG/MIN: 10 INJECTION, EMULSION INTRAVENOUS at 07:03

## 2018-03-12 RX ADMIN — SODIUM CHLORIDE: 0.9 INJECTION, SOLUTION INTRAVENOUS at 07:03

## 2018-03-12 RX ADMIN — LIDOCAINE HYDROCHLORIDE 50 MG: 20 INJECTION, SOLUTION INTRAVENOUS at 07:03

## 2018-03-12 RX ADMIN — PROPOFOL 50 MG: 10 INJECTION, EMULSION INTRAVENOUS at 07:03

## 2018-03-12 NOTE — ANESTHESIA POSTPROCEDURE EVALUATION
"Anesthesia Post Evaluation    Patient: Washington Manzo    Procedure(s) Performed: Procedure(s) (LRB):  COLONOSCOPY (N/A)    Final Anesthesia Type: general  Patient location during evaluation: GI PACU  Patient participation: Yes- Able to Participate  Level of consciousness: awake and alert  Post-procedure vital signs: reviewed and stable  Pain management: adequate  Airway patency: patent  PONV status at discharge: No PONV  Anesthetic complications: no      Cardiovascular status: blood pressure returned to baseline  Respiratory status: unassisted  Hydration status: euvolemic  Follow-up not needed.        Visit Vitals  BP (!) 142/66 (BP Location: Left leg, Patient Position: Sitting)   Pulse 77   Temp 36.6 °C (97.9 °F) (Temporal)   Resp 20   Ht 5' 9" (1.753 m)   Wt (!) 141.5 kg (312 lb)   SpO2 100%   BMI 46.07 kg/m²       Pain/Nandini Score: Pain Assessment Performed: Yes (3/12/2018  8:54 AM)  Presence of Pain: denies (3/12/2018  8:34 AM)  Nandini Score: 10 (3/12/2018  8:54 AM)      "

## 2018-03-12 NOTE — H&P
Short Stay Endoscopy History and Physical    PCP - Francisco Hernandez MD    Procedure - Colonoscopy  Sedation: GA  ASA - per anesthesia  Mallampati - per anesthesia  History of Anesthesia problems - no  Family history Anesthesia problems -  no     HPI:  This is a 57 y.o. male here for evaluation of : Previous colon polyps    Reflux - no  Dysphagia - no  Abdominal pain - no  Diarrhea - no    ROS:  Constitutional: No fevers, chills, No weight loss  ENT: No allergies  CV: No chest pain  Pulm: No cough, No shortness of breath  Ophtho: No vision changes  GI: see HPI  Derm: No rash  Heme: No lymphadenopathy, No bruising  MSK: No arthritis  : No dysuria, No hematuria  Endo: No hot or cold intolerance  Neuro: No syncope, No seizure  Psych: No anxiety, No depression    Medical History:  has a past medical history of Arthritis; Colon polyp; Diabetes mellitus; Diabetes mellitus type II, uncontrolled (2/19/2013); ED (erectile dysfunction) (1/29/2014); History of bariatric surgery (1/29/2014); History of colonic polyps (10/17/2012); HTN (hypertension) (1/29/2014); Hyperlipidemia (5/16/2013); Hypertension; Iron deficiency anemia (2/19/2013); Knee pain, bilateral (7/12/2013); Lymphedema (7/12/2013); Morbid obesity (2/19/2013); Sleep apnea (2/19/2013); and Vitamin D deficiency disease (2/19/2013).    Surgical History:  has a past surgical history that includes Colonoscopy and Gastric bypass (5/24/2013).    Family History: family history includes Cancer in his maternal aunt; Cataracts in his father and mother; Colon polyps in his mother; Diabetes in his brother and brother; No Known Problems in his sister; Prostate cancer in his father.. Otherwise no colon cancer, inflammatory bowel disease, or GI malignancies.    Social History:  reports that he has never smoked. He has never used smokeless tobacco. He reports that he does not drink alcohol or use drugs.    Review of patient's allergies indicates:   Allergen Reactions    Mobic  [meloxicam] Other (See Comments)     High risk  For ulcers    Morphine Itching    Nsaids (non-steroidal anti-inflammatory drug)        Medications:   Prescriptions Prior to Admission   Medication Sig Dispense Refill Last Dose    b complex vitamins tablet Take 1 tablet by mouth once daily.   3/11/2018 at Unknown time    BREEZE 2 TEST STRIPS Strp    3/12/2018 at Unknown time    cyanocobalamin (VITAMIN B-12) 1000 MCG tablet Take 1,000 mcg by mouth once a week.    Past Week at Unknown time    dulaglutide (TRULICITY) 1.5 mg/0.5 mL PnIj Inject 1.5 mg into the skin every 7 days. 2 mL 2 Past Week at Unknown time    hydrocodone-acetaminophen 7.5-325mg (NORCO) 7.5-325 mg per tablet Take 1 tablet by mouth every 6 (six) hours as needed for Pain. 20 tablet 0 Past Month at Unknown time    LANCETS & BLOOD GLUCOSE STRIPS MISC 2 (two) times daily.    3/12/2018 at Unknown time    phentermine (ADIPEX-P) 37.5 mg tablet Take 37.5 mg by mouth before breakfast.   3/11/2018 at Unknown time    polyethylene glycol (GLYCOLAX) 17 gram/dose powder MIX ONE CAPFUL IN EIGHT OUNCE WATER TO TAKE BY MOUTH DAILY] 510 g 11 Past Week at Unknown time    sodium,potassium,mag sulfates (SUPREP BOWEL PREP KIT) 17.5-3.13-1.6 gram SolR As directed, dispense 1 kit. 1 Bottle 0 3/11/2018 at Unknown time    CALCIUM CITRATE/VITAMIN D3 (CALCIUM CITRATE + D ORAL) Take 1,500 mg by mouth.   More than a month at Unknown time    dulaglutide (TRULICITY) 1.5 mg/0.5 mL PnIj Inject 1.5 mg into the skin once a week. 2 mL 5 Taking    erythromycin (ROMYCIN) ophthalmic ointment Place into both eyes 3 (three) times daily. 1 Tube 0 Not Taking    multivitamin with minerals tablet Take 1 tablet by mouth once daily.   Taking    tadalafil (CIALIS) 20 MG Tab 1 tab as needed every 36 hours as needed. 9 tablet 11 Unknown    vitamin D 1000 units Tab Take by mouth once daily.   Taking       Objective Findings:    Vital Signs: Per nursing notes.    Physical Exam:  General  Appearance: Well appearing in no acute distress  Head:   Normocephalic, without obvious abnormality  Eyes:    No scleral icterus  Airway: Open  Neck: No restriction in mobility  Lungs: CTA bilaterally in anterior and posterior fields, no wheezes, no crackles.  Heart:  Regular rate and rhythm, S1, S2 normal, no murmurs heard  Abdomen: Soft, non tender, non distended      Labs:  Lab Results   Component Value Date    WBC 8.31 09/21/2017    HGB 12.3 (L) 09/21/2017    HCT 37.2 (L) 09/21/2017     09/21/2017    CHOL 201 (H) 03/27/2017    TRIG 118 03/27/2017    HDL 63 03/27/2017    ALT 25 09/21/2017    AST 20 09/21/2017     10/11/2017    K 4.1 10/11/2017     10/11/2017    CREATININE 1.1 10/11/2017    BUN 21 (H) 10/11/2017    CO2 27 10/11/2017    TSH 1.933 09/21/2017    PSA 0.44 03/27/2017    HGBA1C 5.5 09/21/2017         I have explained the risks and benefits of endoscopy procedures to the patient including but not limited to bleeding, perforation, infection, and death.    Thank you so much for allowing me to participate in the care of Washington rOozco MD

## 2018-03-12 NOTE — PROVATION PATIENT INSTRUCTIONS
Discharge Summary/Instructions after an Endoscopic Procedure  Patient Name: Washington Manzo  Patient MRN: [id number]  Patient YOB: 1960 Monday, March 12, 2018  Dom Orozco MD  RESTRICTIONS:  During your procedure today, you received medications for sedation.  These   medications may affect your judgment, balance and coordination.  Therefore,   for 24 hours, you have the following restrictions:   - DO NOT drive a car, operate machinery, make legal/financial decisions,   sign important papers or drink alcohol.    ACTIVITY:  The following day: return to full activity including work, except no heavy   lifting, straining or running for 3 days if polyps were removed.  DIET:  Eat and drink normally unless instructed otherwise.     TREATMENT FOR COMMON SIDE EFFECTS:  - Mild abdominal pain, nausea, belching, bloating or excessive gas:  rest,   eat lightly and use a heating pad.  - Sore Throat: treat with throat lozenges and/or gargle with warm salt   water.  - Because air was used during the procedure, expelling large amounts of air   from your rectum or belching is normal.  - If a bowel prep was taken, you may not have a bowel movement for 1-3 days.    This is normal.  SYMPTOMS TO WATCH FOR AND REPORT TO YOUR PHYSICIAN:  1. Abdominal pain or bloating, other than gas cramps.  2. Chest pain.  3. Back pain.  4. Signs of infection such as: chills or fever occurring within 24 hours   after the procedure.  5. Rectal bleeding, which would show as bright red, maroon, or black stools.   (A tablespoon of blood from the rectum is not serious, especially if   hemorrhoids are present.)  6. Vomiting.  7. Weakness or dizziness.  GO DIRECTLY TO THE NEAREST EMERGENCY ROOM IF YOU HAVE ANY OF THE FOLLOWING:      Difficulty breathing  Chills and/or fever over 101 F   Persistent vomiting and/or vomiting blood   Severe abdominal pain   Severe chest pain   Black, tarry stools   Bleeding- more than one tablespoon   Any other symptom  or condition that you feel may need urgent attention  Your doctor recommends these additional instructions:  If any biopsies were taken, your doctors clinic will contact you in 1 to 2   weeks with any results.  You have a contact number available for emergencies.  The signs and symptoms   of potential delayed complications were discussed with you.  You may return   to normal activities tomorrow.  Written discharge instructions were   provided to you.   You are being discharged to home.   Resume your previous diet.   Continue your present medications.   Your physician has recommended a repeat colonoscopy in five years for   surveillance.  For questions, problems or results please call your physician - Dom Orozco MD at Work:  [phone].  [facility], EMERGENCY ROOM PHONE NUMBER: [phone]  IF A COMPLICATION OR EMERGENCY SITUATION ARISES AND YOU ARE UNABLE TO REACH   YOUR PHYSICIAN - GO DIRECTLY TO THE EMERGENCY ROOM.  Dom Orozco MD  3/12/2018 8:21:11 AM  This report has been verified and signed electronically.

## 2018-03-12 NOTE — TRANSFER OF CARE
"Anesthesia Transfer of Care Note    Patient: Washington Manzo    Procedure(s) Performed: Procedure(s) (LRB):  COLONOSCOPY (N/A)    Patient location: PACU    Anesthesia Type: general    Transport from OR: Transported from OR on room air with adequate spontaneous ventilation    Post pain: adequate analgesia    Post assessment: no apparent anesthetic complications and tolerated procedure well    Post vital signs: stable    Level of consciousness: awake, alert and oriented    Nausea/Vomiting: no nausea/vomiting    Complications: none    Transfer of care protocol was followed      Last vitals:   Visit Vitals  BP (!) 165/77 (BP Location: Left arm, Patient Position: Lying)   Pulse 80   Temp 36.6 °C (97.9 °F) (Temporal)   Resp 14   Ht 5' 9" (1.753 m)   Wt (!) 141.5 kg (312 lb)   SpO2 100%   BMI 46.07 kg/m²     "

## 2018-03-12 NOTE — ANESTHESIA PREPROCEDURE EVALUATION
Past Medical History:   Diagnosis Date    Arthritis     Colon polyp     Diabetes mellitus     Diabetes mellitus type II, uncontrolled 2/19/2013    ED (erectile dysfunction) 1/29/2014    History of bariatric surgery 1/29/2014    History of colonic polyps 10/17/2012    HTN (hypertension) 1/29/2014    Hyperlipidemia 5/16/2013    Hypertension     Iron deficiency anemia 2/19/2013    Knee pain, bilateral 7/12/2013    Lymphedema 7/12/2013    Morbid obesity 2/19/2013    Sleep apnea 2/19/2013    Vitamin D deficiency disease 2/19/2013     Past Surgical History:   Procedure Laterality Date    COLONOSCOPY      GASTRIC BYPASS  5/24/2013    [facility]     Patient Active Problem List   Diagnosis    DM (diabetes mellitus)    History of colonic polyps    Iron deficiency anemia    Vitamin D deficiency disease    Sleep apnea    Morbid obesity    Controlled diabetes mellitus type II without complication    Hyperlipidemia    Lymphedema    Knee pain, bilateral    Essential hypertension    History of bariatric surgery    ED (erectile dysfunction)    Acute left lumbar radiculopathy    Muscle spasm of back    Muscle spasm of left lower extremity    History of gastric bypass     Please See ROS/PMH and Active Problem List above                                                                                                              03/12/2018  Washington Manzo is a 57 y.o., male.    Anesthesia Evaluation    I have reviewed the Patient Summary Reports.    I have reviewed the Nursing Notes.   I have reviewed the Medications.     Review of Systems      Physical Exam  General:  Morbid Obesity, Malnutrition    Airway/Jaw/Neck:  Airway Findings: Mouth Opening: Normal Tongue: Normal  General Airway Assessment: Adult, Good  Oropharynx Findings:  Tonsillar Hypertrophy Mallampati: I  Improves to I with phonation.  TM Distance: Normal, at least 6 cm  Jaw/Neck Findings:     Neck ROM: Normal ROM      Dental:  Dental  Findings: In tact   Chest/Lungs:  Chest/Lungs Findings: Clear to auscultation, Normal Respiratory Rate     Heart/Vascular:  Heart Findings: Rate: Normal  Rhythm: Regular Rhythm     Abdomen:  Abdomen Findings:       Mental Status:  Mental Status Findings:  Cooperative, Alert and Oriented         Anesthesia Plan  Type of Anesthesia, risks & benefits discussed:  Anesthesia Type:  general  Patient's Preference: gen  Intra-op Monitoring Plan:   Intra-op Monitoring Plan Comments:   Post Op Pain Control Plan:   Post Op Pain Control Plan Comments: Iv>po  Induction:   IV  Beta Blocker:  Patient is not currently on a Beta-Blocker (No further documentation required).       Informed Consent: Patient understands risks and agrees with Anesthesia plan.  Questions answered. Anesthesia consent signed with patient.  ASA Score: 3     Day of Surgery Review of History & Physical:    H&P update referred to the surgeon.         Ready For Surgery From Anesthesia Perspective.

## 2018-03-19 ENCOUNTER — TELEPHONE (OUTPATIENT)
Dept: ENDOSCOPY | Facility: HOSPITAL | Age: 58
End: 2018-03-19

## 2018-07-19 ENCOUNTER — TELEPHONE (OUTPATIENT)
Dept: BARIATRICS | Facility: CLINIC | Age: 58
End: 2018-07-19

## 2018-07-19 NOTE — TELEPHONE ENCOUNTER
----- Message from Nora Sousa sent at 7/19/2018  1:56 PM CDT -----  Contact: pt   Pt calling about trying to get into a support group please call the Pt. [phone]

## 2018-09-25 ENCOUNTER — OFFICE VISIT (OUTPATIENT)
Dept: BARIATRICS | Facility: CLINIC | Age: 58
End: 2018-09-25
Payer: COMMERCIAL

## 2018-09-25 VITALS
SYSTOLIC BLOOD PRESSURE: 138 MMHG | HEIGHT: 69 IN | HEART RATE: 75 BPM | DIASTOLIC BLOOD PRESSURE: 82 MMHG | BODY MASS INDEX: 46.65 KG/M2 | WEIGHT: 315 LBS

## 2018-09-25 DIAGNOSIS — E66.01 CLASS 3 SEVERE OBESITY DUE TO EXCESS CALORIES WITH SERIOUS COMORBIDITY AND BODY MASS INDEX (BMI) OF 45.0 TO 49.9 IN ADULT: ICD-10-CM

## 2018-09-25 DIAGNOSIS — Z98.84 HISTORY OF GASTRIC BYPASS: Primary | ICD-10-CM

## 2018-09-25 PROCEDURE — 3008F BODY MASS INDEX DOCD: CPT | Mod: CPTII,S$GLB,, | Performed by: INTERNAL MEDICINE

## 2018-09-25 PROCEDURE — 99214 OFFICE O/P EST MOD 30 MIN: CPT | Mod: S$GLB,,, | Performed by: INTERNAL MEDICINE

## 2018-09-25 PROCEDURE — 3075F SYST BP GE 130 - 139MM HG: CPT | Mod: CPTII,S$GLB,, | Performed by: INTERNAL MEDICINE

## 2018-09-25 PROCEDURE — 3079F DIAST BP 80-89 MM HG: CPT | Mod: CPTII,S$GLB,, | Performed by: INTERNAL MEDICINE

## 2018-09-25 PROCEDURE — 99999 PR PBB SHADOW E&M-EST. PATIENT-LVL III: CPT | Mod: PBBFAC,,, | Performed by: INTERNAL MEDICINE

## 2018-09-25 NOTE — PROGRESS NOTES
"Subjective:       Patient ID: Washington Manzo is a 58 y.o. male.    Chief Complaint: No chief complaint on file.    Pt here today for follow-up. Has lost 8 more lbs, 36 lbs in total. Last seen in Nov.  Started trulicity with 1500 aurelia diet. He has been off the trulicity. He has gained about 15 lbs since that time. He does find his cravings are less than when I first started to see him, but may be coming back. The medication did help with that. When he does have cravings, they are for carbs. The only SE he had was some nausea in the first day after injection, and states that was mild. His new PCP in [place] is Rxing Phentermine for him. HAs had rerecent A1c with OS PCP. States was under control.       Review of Systems   Constitutional: Negative for chills and fever.   Respiratory: Negative for apnea and shortness of breath.    Cardiovascular: Positive for leg swelling. Negative for chest pain.   Gastrointestinal: Positive for constipation. Negative for diarrhea.        Denies HB   Endocrine:        Occasional hypoglycemia   Genitourinary: Negative for difficulty urinating and dysuria.   Musculoskeletal: Positive for arthralgias. Negative for back pain.        Knees and ankles   Neurological: Negative for dizziness and light-headedness.   Psychiatric/Behavioral: Negative for dysphoric mood. The patient is not nervous/anxious.        Objective:     /82 (BP Location: Right arm, Patient Position: Sitting, BP Method: X-Large (Manual))   Pulse 75   Ht 5' 9" (1.753 m)   Wt (!) 143.2 kg (315 lb 11.2 oz)   BMI 46.62 kg/m²     Physical Exam   Constitutional: He is oriented to person, place, and time. He appears well-developed. No distress.   Morbidly obese     HENT:   Head: Normocephalic and atraumatic.   Eyes: Pupils are equal, round, and reactive to light. No scleral icterus.   Neck: Normal range of motion. Neck supple.   Cardiovascular: Normal rate, regular rhythm and normal heart sounds. Exam reveals no gallop and no " friction rub.   No murmur heard.  Pulmonary/Chest: Effort normal and breath sounds normal. No respiratory distress. He has no wheezes.   Musculoskeletal: Normal range of motion.   Lymphedema LLE.    Neurological: He is alert and oriented to person, place, and time.   Skin: Skin is warm and dry.   Psychiatric: He has a normal mood and affect. His behavior is normal. Judgment normal.   Vitals reviewed.      Assessment:       1. History of gastric bypass    2. Class 3 severe obesity due to excess calories with serious comorbidity and body mass index (BMI) of 45.0 to 49.9 in adult        Plan:         1. History of gastric bypass  Continue bariatric diet for life.         2. Class 3 severe obesity due to excess calories with serious comorbidity and body mass index (BMI) of 45.0 to 49.9 in adult      .  1200 aurelia menu given.     Start Trulicity once a week.     Decrease portions as soon as you start Trulicity. Some nausea in the first 2 weeks is not unusual.     If you get pain across the upper abdomen and around to your back, please call the office.     Discussed risks vs. Benefits of meds and he will go back on the trulicity.     25 min face to face time.

## 2018-09-25 NOTE — PATIENT INSTRUCTIONS
Sample meal plan  80-120g protein; 9537-7532 calories  Protein drinks and bars: 0-4 grams sugar  Drink lots of water throughout the day and exercise!  MENU # 1  Breakfast: 2 eggs, 1 turkey sausage brandyn  Lunch: 2-3 roll-ups (sliced turkey, low-fat slice cheese), baby carrots dipped in 1 Tbsp natural peanut butter  Mid-Day Snack: ¼ cup unsalted almonds, ½ cup fruit  Supper: 1 chicken thigh simmered in tomato sauce + 2 Tbsp mozzarella cheese, ½ cup black beans, 1/2 cup steamed veggies  Evening Snack: 1/2 cup grapes with 4 cubes low-fat cheddar cheese   ___________________________________________________  MENU # 2  Breakfast: protein drink  Mid-morning snack : ¼ cup unsalted nuts  Lunch: 1 cup tuna or chicken salad made with light gonzales, over salad.   Supper: hamburger brandyn, slice of cheese, 1 cup steamed veggies.   Snack: light yogurt      Menu #3  Breakfast: 6oz plain Greek yogurt + fruit (½ banana, ½ cup fruit - pineapple, blueberries, strawberries, peach), may add Splenda to seferino.  Lunch: ½  chicken breast w/ slice pepper pura cheese, 1/2 cup steamed veggies and small salad with Salad Spritzer.    Mid-Day snack: protein drink   Supper: 4oz Grilled fish, grilled veggie kabob ( mushrooms, onion, bell pepper, yellow squash, zucchini, cherry tomatoes)  Evening Snack: fudgsicle no-sugar-added    Menu # 4  Breakfast: vanilla iced coffee: 1 scoop vanilla protein powder + 4oz skim milk + 4oz coffee   Snack: protein bar  Lunch: 2 Lettuce tacos: ¼ cup seasoned ground turkey wrapped in a Juan F lettuce leaf with 1 Tbsp shredded cheese and dollop low-fat sour cream  Dinner: Shrimp omelet: 2 eggs, ½ cup shrimp, green onions, and shredded cheese        Menu #5  Breakfast: 1 cup low-fat cottage cheese, ½ cup peaches (no sugar added)  Lunch: 2 oz baked pork chop, 1 cup okra and tomato stew  Snack: 1 cup black beans + salsa + dollop sour cream  Dinner: Caprese chicken salad: 2 oz chicken, 1oz fresh mozzarella, sliced tomato, 1  Tbsp olive oil, basil  Snack: sugar-free pudding cup      Menu #6  Breakfast: ½ cup part-skim ricotta cheese, 2 Tbsp sugar-free strawberry preserves, ¼ cup slivered almonds  Lunch: 2 oz canned chicken, 1oz shredded cheddar cheese, ½ cup black beans, 2 Tbsp salsa  Snack: Protein drink  Dinner: 4 oz grilled salmon steak, over mixed green salad with light dressing

## 2018-10-19 DIAGNOSIS — E11.9 DIABETES MELLITUS WITHOUT COMPLICATION: Primary | ICD-10-CM

## 2018-11-26 ENCOUNTER — APPOINTMENT (RX ONLY)
Dept: URBAN - METROPOLITAN AREA CLINIC 70 | Facility: CLINIC | Age: 58
Setting detail: DERMATOLOGY
End: 2018-11-26

## 2018-11-26 DIAGNOSIS — L72.8 OTHER FOLLICULAR CYSTS OF THE SKIN AND SUBCUTANEOUS TISSUE: ICD-10-CM

## 2018-11-26 DIAGNOSIS — L91.8 OTHER HYPERTROPHIC DISORDERS OF THE SKIN: ICD-10-CM

## 2018-11-26 PROBLEM — I10 ESSENTIAL (PRIMARY) HYPERTENSION: Status: ACTIVE | Noted: 2018-11-26

## 2018-11-26 PROBLEM — M12.9 ARTHROPATHY, UNSPECIFIED: Status: ACTIVE | Noted: 2018-11-26

## 2018-11-26 PROBLEM — E13.9 OTHER SPECIFIED DIABETES MELLITUS WITHOUT COMPLICATIONS: Status: ACTIVE | Noted: 2018-11-26

## 2018-11-26 PROBLEM — L29.8 OTHER PRURITUS: Status: ACTIVE | Noted: 2018-11-26

## 2018-11-26 PROCEDURE — ? DEFER

## 2018-11-26 PROCEDURE — ? COUNSELING

## 2018-11-26 PROCEDURE — 11200 RMVL SKIN TAGS UP TO&INC 15: CPT

## 2018-11-26 PROCEDURE — 99202 OFFICE O/P NEW SF 15 MIN: CPT | Mod: 25

## 2018-11-26 PROCEDURE — ? SKIN TAG REMOVAL

## 2018-11-26 ASSESSMENT — LOCATION SIMPLE DESCRIPTION DERM
LOCATION SIMPLE: POSTERIOR NECK
LOCATION SIMPLE: CHEST
LOCATION SIMPLE: RIGHT ANTERIOR NECK
LOCATION SIMPLE: LEFT ANTERIOR NECK

## 2018-11-26 ASSESSMENT — LOCATION DETAILED DESCRIPTION DERM
LOCATION DETAILED: RIGHT SUPERIOR LATERAL NECK
LOCATION DETAILED: LEFT SUPERIOR LATERAL NECK
LOCATION DETAILED: RIGHT LATERAL INFERIOR CHEST
LOCATION DETAILED: RIGHT INFERIOR LATERAL NECK
LOCATION DETAILED: LEFT INFERIOR LATERAL NECK
LOCATION DETAILED: MID POSTERIOR NECK
LOCATION DETAILED: LEFT SUPERIOR POSTERIOR NECK

## 2018-11-26 ASSESSMENT — LOCATION ZONE DERM
LOCATION ZONE: TRUNK
LOCATION ZONE: NECK

## 2018-11-26 NOTE — PROCEDURE: SKIN TAG REMOVAL
Anesthesia Type: 1% lidocaine with epinephrine
Include Z78.9 (Other Specified Conditions Influencing Health Status) As An Associated Diagnosis?: No
Anesthesia Volume In Cc: 1
Detail Level: Detailed
Medical Necessity Information: It is in your best interest to select a reason for this procedure from the list below. All of these items fulfill various CMS LCD requirements except the new and changing color options.
Add Associated Diagnoses If Applicable When Selecting Medical Necessity: Yes
Consent: Written consent obtained and the risks of skin tag removal was reviewed with the patient including but not limited to bleeding, pigmentary change, infection, pain, and remote possibility of scarring.
Hemostasis: Tiffani's
Medical Necessity Clause: This procedure was medically necessary because the lesions that were treated were:

## 2018-12-21 ENCOUNTER — APPOINTMENT (RX ONLY)
Dept: URBAN - METROPOLITAN AREA CLINIC 70 | Facility: CLINIC | Age: 58
Setting detail: DERMATOLOGY
End: 2018-12-21

## 2018-12-21 DIAGNOSIS — L72.8 OTHER FOLLICULAR CYSTS OF THE SKIN AND SUBCUTANEOUS TISSUE: ICD-10-CM

## 2018-12-21 PROCEDURE — 11402 EXC TR-EXT B9+MARG 1.1-2 CM: CPT

## 2018-12-21 PROCEDURE — ? COUNSELING

## 2018-12-21 PROCEDURE — 12031 INTMD RPR S/A/T/EXT 2.5 CM/<: CPT

## 2018-12-21 PROCEDURE — ? PUNCH EXCISION

## 2018-12-21 ASSESSMENT — LOCATION DETAILED DESCRIPTION DERM: LOCATION DETAILED: RIGHT RIB CAGE

## 2018-12-21 ASSESSMENT — LOCATION ZONE DERM: LOCATION ZONE: TRUNK

## 2018-12-21 ASSESSMENT — LOCATION SIMPLE DESCRIPTION DERM: LOCATION SIMPLE: ABDOMEN

## 2018-12-21 NOTE — PROCEDURE: PUNCH EXCISION
Path Notes (To The Dermatopathologist): Please check margins.
4.5 Mm Punch Excision Text: A 4.5 mm punch biopsy was used to make an initial incision over the lesion.  After this overlying column of skin was removed, blunt dissection was used to free the lesion from the surrounding tissues and the lesion was extirpated through the surgical opening made by the punch biopsy.
Epidermal Closure: running
5 Mm Punch Excision Text: A 5 mm punch biopsy was used to make an initial incision over the lesion.  After this overlying column of skin was removed, blunt dissection was used to free the lesion from the surrounding tissues and the lesion was extirpated through the surgical opening made by the punch biopsy.
Excision Depth: adipose tissue
6 Mm Punch Excision Text: A 6 mm punch biopsy was used to make an initial incision over the lesion.  After this overlying column of skin was removed, blunt dissection was used to free the lesion from the surrounding tissues and the lesion was extirpated through the surgical opening made by the punch biopsy.
Intermediate / Complex Repair - Final Wound Length In Cm: 1.5
Complex Repair Preamble Text (Leave Blank If You Do Not Want): Extensive wide undermining was performed.
Suture Removal: 14 days
Intermediate Repair Preamble Text (Leave Blank If You Do Not Want): Undermining was performed with blunt dissection.
Billing Type: Third-Party Bill
Medical Necessity Clause: The excision was medically necessary because the lesion which was excised was
7 Mm Punch Excision Text: A 7 mm punch biopsy was used to make an initial incision over the lesion.  After this overlying column of skin was removed, blunt dissection was used to free the lesion from the surrounding tissues and the lesion was extirpated through the surgical opening made by the punch biopsy.
1.5 Mm Punch Excision Text: A 1.5 mm punch biopsy was used to make an initial incision over the lesion.  After this overlying column of skin was removed, blunt dissection was used to free the lesion from the surrounding tissues and the lesion was extirpated through the surgical opening made by the punch biopsy.
Wound Care: Petrolatum
Size Of Lesion In Cm: 1
Medical Necessity Clause: This procedure was medically necessary because the lesion that was treated was:
Epidermal Sutures: 5-0 Ethilon
X Size Of Lesion In Cm (Optional): 0
8 Mm Punch Excision Text: A 8 mm punch biopsy was used to make an initial incision over the lesion.  After this overlying column of skin was removed, blunt dissection was used to free the lesion from the surrounding tissues and the lesion was extirpated through the surgical opening made by the punch biopsy.
Anesthesia Type: 1% lidocaine with epinephrine
10 Mm Punch Excision Text: A 10 mm punch biopsy was used to make an initial incision over the lesion.  After this overlying column of skin was removed, blunt dissection was used to free the lesion from the surrounding tissues and the lesion was extirpated through the surgical opening made by the punch biopsy.
Bill For Surgical Tray: no
2 Mm Punch Excision Text: A 2 mm punch biopsy was used to make an initial incision over the lesion.  After this overlying column of skin was removed, blunt dissection was used to free the lesion from the surrounding tissues and the lesion was extirpated through the surgical opening made by the punch biopsy.
Consent was obtained from the patient. The risks and benefits to therapy were discussed in detail. Specifically, the risks of infection, scarring, bleeding, prolonged wound healing, incomplete removal, allergy to anesthesia, nerve injury and recurrence were addressed. Prior to the procedure, the treatment site was clearly identified and confirmed by the patient. All components of Universal Protocol/PAUSE Rule completed.
2.5 Mm Punch Excision Text: A 2.5 mm punch biopsy was used to make an initial incision over the lesion.  After this overlying column of skin was removed, blunt dissection was used to free the lesion from the surrounding tissues and the lesion was extirpated through the surgical opening made by the punch biopsy.
Size Of Margin In Cm: 0.1
Render Post-Care Instructions In Note?: yes
Hemostasis: Electrocautery
Dressing: dry sterile dressing
12 Mm Punch Excision Text: A 12 mm punch biopsy was used to make an initial incision over the lesion.  After this overlying column of skin was removed, blunt dissection was used to free the lesion from the surrounding tissues and the lesion was extirpated through the surgical opening made by the punch biopsy.
Lab: 6
3 Mm Punch Excision Text: A 3 mm punch biopsy was used to make an initial incision over the lesion.  After this overlying column of skin was removed, blunt dissection was used to free the lesion from the surrounding tissues and the lesion was extirpated through the surgical opening made by the punch biopsy.
Excision Method: 10 mm Punch
Purse String (Intermediate) Text: Given the location of the defect and the characteristics of the surrounding skin a purse string intermediate closure was deemed most appropriate.  Undermining was performed circumfirentially around the surgical defect.  A purse string suture was then placed and tightened.
Deep Sutures: 5-0 Vicryl
3.5 Mm Punch Excision Text: A 3.5 mm punch biopsy was used to make an initial incision over the lesion.  After this overlying column of skin was removed, blunt dissection was used to free the lesion from the surrounding tissues and the lesion was extirpated through the surgical opening made by the punch biopsy.
Estimated Blood Loss (Cc): minimal
Post-Care Instructions: I reviewed with the patient in detail post-care instructions. Patient is not to engage in any heavy lifting, exercise, or swimming for the next 14 days. Should the patient develop any fevers, chills, bleeding, severe pain patient will contact the office immediately.
4 Mm Punch Excision Text: A 4 mm punch biopsy was used to make an initial incision over the lesion.  After this overlying column of skin was removed, blunt dissection was used to free the lesion from the surrounding tissues and the lesion was extirpated through the surgical opening made by the punch biopsy.
Detail Level: Detailed
Repair Type: Intermediate

## 2019-01-08 ENCOUNTER — APPOINTMENT (RX ONLY)
Dept: URBAN - METROPOLITAN AREA CLINIC 70 | Facility: CLINIC | Age: 59
Setting detail: DERMATOLOGY
End: 2019-01-08

## 2019-01-08 DIAGNOSIS — Z48.02 ENCOUNTER FOR REMOVAL OF SUTURES: ICD-10-CM

## 2019-01-08 PROCEDURE — ? SUTURE REMOVAL (GLOBAL PERIOD)

## 2019-01-08 PROCEDURE — 99024 POSTOP FOLLOW-UP VISIT: CPT

## 2019-01-08 ASSESSMENT — LOCATION DETAILED DESCRIPTION DERM: LOCATION DETAILED: RIGHT RIB CAGE

## 2019-01-08 ASSESSMENT — LOCATION ZONE DERM: LOCATION ZONE: TRUNK

## 2019-01-08 ASSESSMENT — LOCATION SIMPLE DESCRIPTION DERM: LOCATION SIMPLE: ABDOMEN

## 2019-01-08 NOTE — PROCEDURE: SUTURE REMOVAL (GLOBAL PERIOD)
Add 49914 Cpt? (Important Note: In 2017 The Use Of 45600 Is Being Tracked By Cms To Determine Future Global Period Reimbursement For Global Periods): yes
Body Location Override (Optional - Billing Will Still Be Based On Selected Body Map Location If Applicable): Right Rib Cage
Detail Level: Simple

## 2022-09-23 ENCOUNTER — TELEPHONE (OUTPATIENT)
Dept: FAMILY MEDICINE | Facility: CLINIC | Age: 62
End: 2022-09-23
Payer: COMMERCIAL

## 2022-09-23 NOTE — TELEPHONE ENCOUNTER
----- Message from Radha Davis sent at 9/23/2022  9:22 AM CDT -----  Type: Patient Call Back    Who called:self    What is the request in detail:Pt would like to know if can receive his colonoscopy results from 2018. He is unable to find results on portal.    Can the clinic reply by [facility]?    Would the patient rather a call back or a response via [facility]? call    Best call back number:[phone] (home)
